# Patient Record
Sex: FEMALE | URBAN - METROPOLITAN AREA
[De-identification: names, ages, dates, MRNs, and addresses within clinical notes are randomized per-mention and may not be internally consistent; named-entity substitution may affect disease eponyms.]

---
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
## 2021-01-12 ENCOUNTER — ROUTINE PRENATAL (OUTPATIENT)
Dept: OBGYN CLINIC | Age: 21
End: 2021-01-12
Payer: COMMERCIAL

## 2021-01-12 VITALS
BODY MASS INDEX: 34.52 KG/M2 | WEIGHT: 227 LBS | HEART RATE: 88 BPM | SYSTOLIC BLOOD PRESSURE: 98 MMHG | DIASTOLIC BLOOD PRESSURE: 64 MMHG

## 2021-01-12 DIAGNOSIS — R82.71 GBS BACTERIURIA: ICD-10-CM

## 2021-01-12 DIAGNOSIS — O26.899 RH NEGATIVE STATE IN ANTEPARTUM PERIOD: ICD-10-CM

## 2021-01-12 DIAGNOSIS — Z34.92 PRENATAL CARE IN SECOND TRIMESTER: Primary | ICD-10-CM

## 2021-01-12 DIAGNOSIS — Z34.91 PRENATAL CARE IN FIRST TRIMESTER: ICD-10-CM

## 2021-01-12 DIAGNOSIS — Z67.91 RH NEGATIVE STATE IN ANTEPARTUM PERIOD: ICD-10-CM

## 2021-01-12 PROCEDURE — 36415 COLL VENOUS BLD VENIPUNCTURE: CPT | Performed by: OBSTETRICS & GYNECOLOGY

## 2021-01-12 PROCEDURE — 0502F SUBSEQUENT PRENATAL CARE: CPT | Performed by: OBSTETRICS & GYNECOLOGY

## 2021-01-12 NOTE — ASSESSMENT & PLAN NOTE
- FWB: reassuring by rohan today  - Genetic screening: MQS today  - Anatomy scan: plan at 20 weeks  - Flu shot: declined today  - Tdap: plan at 28wks  - PNL: B-/ab-, RI, HIVnr, RPRnr, HepB neg, UDS neg, Hgb 12.2, GCCT/trich neg, UCx +GBS (will need abx in labor)

## 2021-01-12 NOTE — PROGRESS NOTES
Temporal temp: 97.3  Maternal emotional well being screening form completed and reviewed with patient. Current score is 8. Patient given referral to [address] [place] [address] ([phone]):  No

## 2021-01-12 NOTE — PROGRESS NOTES
Return OB Office Visit    CC:   Chief Complaint   Patient presents with    Routine Prenatal Visit       HPI:  Pt seen and examined. No concerns/complaints. Denies VB, LOF. No cramps. ?maybe feeling small fetal movements. Does say she feels occasionally nauseated, especially if she doesn't eat anything first thing in the morning. Sometimes gets nauseated with certain smells as well but overall feeling OK. Maternal wellness questionnaire reviewed - no concerns today. Score 8. Says she has taken a break from work for right now and her moods have been better, has been able to focus more on taking care of herself.     Objective:  BP 98/64   Pulse 88   Wt (!) 227 lb (103 kg)   LMP 09/27/2020 (Exact Date)   BMI 34.52 kg/m²   Gen: AO, NAD  Abd: Soft, NT  FHT: 203  FH: below umbilicus    Assessment/Plan:  24 y.o. Ana Washington at 15w2d (Estimated Date of Delivery: 7/4/21) presents for KATH appointment:     Problem List Items Addressed This Visit        Gynecology/Obstetric Problems    Prenatal care in first trimester     - FWB: reassuring by rohan today  - Genetic screening: MQS today  - Anatomy scan: plan at 20 weeks  - Flu shot: declined today  - Tdap: plan at 28wks  - PNL: B-/ab-, RI, HIVnr, RPRnr, HepB neg, UDS neg, Hgb 12.2, GCCT/trich neg, UCx +GBS (will need abx in labor)         Rh negative state in antepartum period     B-/ab-  - will need rhogam at 28wks and PP work-up            Other    GBS bacteriuria     On initial UCx, asymptomatic  - plan abx in labor           Other Visit Diagnoses     Prenatal care in second trimester    -  Primary    Relevant Orders    MATERNAL SCREEN 4          Dispo: RTC in 4 weeks  Tiera Singh MD

## 2021-01-14 LAB
AFP INTERPRETATION: NORMAL
AFP MOM: 1.16
AFP SPECIMEN: NORMAL
D-INHIBIN: 141 PG/ML
DATING: NORMAL
EER MATERNAL SCREEN AFP, HCG, EST, INH: NORMAL
ESTIMATED DUE DATE: NORMAL
FETUS COUNT: NORMAL
GESTATIONAL AGE CALC AT COLLECT: NORMAL
HISTORY OF ANEUPLOIDY?: NO
HISTORY/NEURAL TUBE DEFECTS: NO
INSULIN DEP. DIABETIC: NO
MATERNAL AGE AT EDD: 19.8 YR
MATERNAL WEIGHT: NORMAL
MOM FOR HCG, 2ND TRIMESTER: 0.6
MOM FOR UE3: 0.6
MOM INHIBN: 1.1
PATIENT'S HCG, 2ND TRIMESTER: NORMAL IU/L
PT AFP: 28 NG/ML
PT UE3: 0.45 NG/ML
RACE: NORMAL
SMOKING: NO

## 2021-02-09 ENCOUNTER — OFFICE VISIT (OUTPATIENT)
Dept: OBGYN CLINIC | Age: 21
End: 2021-02-09
Payer: COMMERCIAL

## 2021-02-09 ENCOUNTER — ROUTINE PRENATAL (OUTPATIENT)
Dept: OBGYN CLINIC | Age: 21
End: 2021-02-09

## 2021-02-09 VITALS
BODY MASS INDEX: 34.76 KG/M2 | HEART RATE: 99 BPM | DIASTOLIC BLOOD PRESSURE: 80 MMHG | WEIGHT: 228.6 LBS | SYSTOLIC BLOOD PRESSURE: 138 MMHG

## 2021-02-09 DIAGNOSIS — O26.899 RH NEGATIVE STATE IN ANTEPARTUM PERIOD: ICD-10-CM

## 2021-02-09 DIAGNOSIS — Z34.92 PRENATAL CARE IN SECOND TRIMESTER: Primary | ICD-10-CM

## 2021-02-09 DIAGNOSIS — R82.71 GBS BACTERIURIA: ICD-10-CM

## 2021-02-09 DIAGNOSIS — Z67.91 RH NEGATIVE STATE IN ANTEPARTUM PERIOD: ICD-10-CM

## 2021-02-09 LAB
ABDOMINAL CIRCUMFERENCE: NORMAL
BIPARIETAL DIAMETER: NORMAL
ESTIMATED FETAL WEIGHT: NORMAL
FEMORAL DIAMETER: NORMAL
HC/AC: NORMAL
HEAD CIRCUMFERENCE: NORMAL

## 2021-02-09 PROCEDURE — 0502F SUBSEQUENT PRENATAL CARE: CPT | Performed by: OBSTETRICS & GYNECOLOGY

## 2021-02-09 PROCEDURE — 76805 OB US >/= 14 WKS SNGL FETUS: CPT | Performed by: OBSTETRICS & GYNECOLOGY

## 2021-02-09 PROCEDURE — 76816 OB US FOLLOW-UP PER FETUS: CPT | Performed by: OBSTETRICS & GYNECOLOGY

## 2021-02-09 NOTE — PROGRESS NOTES
Return OB Office Visit    CC:   Chief Complaint   Patient presents with    Routine Prenatal Visit       HPI:  Pt seen and examined. No concerns/complaints. Denies VB, LOF, ctx. Has had a few small fetal movements but not regular or consistent yet. Maternal wellness questionnaire reviewed - no concerns today. Score 2. Objective:  /80   Pulse 99   Wt (!) 228 lb 9.6 oz (103.7 kg)   LMP 09/27/2020 (Exact Date)   BMI 34.76 kg/m²   Gen: AO, NAD  Abd: Soft, NT  FHT: 162    Ultrasound:  Impression   OBSTETRIC ULTRASOUND -- SECOND TRIMESTER       DATE:  02/09/2021       PHYSICIAN: Mukul Grimm M.D.       SONOGRAPHER: Ben Segal RDMS       INDICATION:  Second trimester, Anatomical screening       TYPE OF SCAN: vaginal, abdominal 3.5 MHz 5MHz       FINDINGS:         A single viable intrauterine pregnancy is noted in Breech presentation. Cardiac and somatic activity are noted.       The following values were obtained:   Fetal heart rate 162bpm   BPD 3.74cm 1.5 %   Head Circumference 16.10cm 24.9 %    Abdominal Circumference 14.29cm 57.5 %   Femur Length 2.71cm 12.1 %   Humerus Length 2.84cm 47.2 %   Cerebellum 1.86cm 11.0 %   Amniotic fluid DVP 4.04cm   EFW 265g 26.0 percentile       Subjective amniotic fluid volume is normal. Based on sonographic criteria, the estimated fetal age is 18weeks and 4days with EDC of 07/09/2021. Maudie Larve is a 5 day discordance with the established EDC of 07/04/2021. The patient has an anterior placenta that is \"adequate distance in relation to the internal cervical os. The evaluation of the lower uterine segment and cervix reveals normal appearing anatomy. Transvaginal cervical length is 4.38cm with no funneling noted. The uterus is unremarkable/gravid.  Maternal ovaries and adnexae are not well visualized due to the size of the uterus and patient's gravid state.       Normal Anatomy seen: Spine CSP   Kidneys Lateral ventricles   Umbilical arteries Cerebellum              Bladder Cisterna magna   Aortic arch Face Nuchal fold   Diaphragm                Upper extremities   Stomach Nose/lips Lower extremities   ACI PCI Choroid plexus       Suboptimal anatomy seen: 4 chamber heart, lvot, rvot, ductal arch, profile, gender       Abnormal anatomy seen:       IMPRESSION:   Single living IUP. No gross structural abnormalities are visualized. Amniotic fluid volume is subjectively normal.       The patient is well aware of the limitations of ultrasound in the detection of fetal anomalies.  The scan is limited by maternal body habitus.              Assessment/Plan:  24 y.o. Wynette Degree at 19w2d (Estimated Date of Delivery: 7/4/21) presents for Qian Kaminski [facility] appointment:     Problem List Items Addressed This Visit        Gynecology/Obstetric Problems    Prenatal care in second trimester - Primary     - FWB: reassuring by US today  - Genetic screening: MQS low risk  - Anatomy scan: 2/9/21 -- Overall wnl although limited due to positioning. Anterior placenta, normal fluid and CL 4.38cm.      - complete anatomy at next visit  - Flu shot: declined today  - Tdap: plan at 28wks  - PNL: B-/ab-, RI, HIVnr, RPRnr, HepB neg, UDS neg, Hgb 12.2, GCCT/trich neg, UCx +GBS (will need abx in labor)         Rh negative state in antepartum period     B-/ab-  - will need rhogam at 28wks and PP work-up            Other    GBS bacteriuria     On initial UCx  - plan abx in labor               Dispo: RTC in 4 weeks, US at that time  Ely Ramos MD

## 2021-02-10 ENCOUNTER — TELEPHONE (OUTPATIENT)
Dept: OBGYN CLINIC | Age: 21
End: 2021-02-10

## 2021-02-10 NOTE — TELEPHONE ENCOUNTER
Please advise patient to continue to drink and eat throughout the day. Take Tylenol for headache. Okay for appointment in AM.  If no improvement or symptoms return please advise patient to present to triage tonight. Thank you.

## 2021-02-10 NOTE — TELEPHONE ENCOUNTER
Pt 19 w 3d .  is calling due to pt having Dizziness and headache around 11 am at work. Describes headache as \"pounding\" No hx of migraines. She has not taken Tylenol or any medication for this. She is not able to check her B/P. She has ate Bananna's,granola bars and milk today. She denies emesis but has had nausea. No pain. +FM, No vaginal discharge or drainage. States she was \" seeing spots earlier\" Dizziness has now stopped but she still has a headache. No CP. No SOB. She is scheduled for am B/P check in office. Please advise if Triage needed sooner.

## 2021-02-11 ENCOUNTER — TELEPHONE (OUTPATIENT)
Dept: OBGYN CLINIC | Age: 21
End: 2021-02-11

## 2021-02-11 ENCOUNTER — NURSE ONLY (OUTPATIENT)
Dept: OBGYN CLINIC | Age: 21
End: 2021-02-11

## 2021-02-11 VITALS — DIASTOLIC BLOOD PRESSURE: 68 MMHG | TEMPERATURE: 97.2 F | SYSTOLIC BLOOD PRESSURE: 114 MMHG | HEART RATE: 89 BPM

## 2021-03-09 ENCOUNTER — ROUTINE PRENATAL (OUTPATIENT)
Dept: OBGYN CLINIC | Age: 21
End: 2021-03-09

## 2021-03-09 ENCOUNTER — OFFICE VISIT (OUTPATIENT)
Dept: OBGYN CLINIC | Age: 21
End: 2021-03-09

## 2021-03-09 VITALS
BODY MASS INDEX: 35.97 KG/M2 | DIASTOLIC BLOOD PRESSURE: 82 MMHG | HEART RATE: 100 BPM | WEIGHT: 236.6 LBS | SYSTOLIC BLOOD PRESSURE: 120 MMHG

## 2021-03-09 DIAGNOSIS — O26.899 RH NEGATIVE STATE IN ANTEPARTUM PERIOD: ICD-10-CM

## 2021-03-09 DIAGNOSIS — R82.71 GBS BACTERIURIA: ICD-10-CM

## 2021-03-09 DIAGNOSIS — Z67.91 RH NEGATIVE STATE IN ANTEPARTUM PERIOD: ICD-10-CM

## 2021-03-09 DIAGNOSIS — Z34.92 PRENATAL CARE IN SECOND TRIMESTER: Primary | ICD-10-CM

## 2021-03-09 PROCEDURE — 99213 OFFICE O/P EST LOW 20 MIN: CPT | Performed by: OBSTETRICS & GYNECOLOGY

## 2021-03-09 PROCEDURE — 76816 OB US FOLLOW-UP PER FETUS: CPT | Performed by: OBSTETRICS & GYNECOLOGY

## 2021-03-09 NOTE — PROGRESS NOTES
Return OB Office Visit    CC:   Chief Complaint   Patient presents with    Routine Prenatal Visit       HPI:  Pt seen and examined. No concerns/complaints. Denies VB, LOF, ctx. +FM. Has been feeling well since last visit overall    Maternal wellness questionnaire reviewed - no concerns today. Score 6. Objective:  /82   Pulse 100   Wt 236 lb 9.6 oz (107.3 kg)   LMP 09/27/2020 (Exact Date)   BMI 35.97 kg/m²   Gen: AO, NAD  Abd: Soft, NT  FHT: 150    Ultrasound:  Impression   OBSTETRICAL ULTRASOUND GROWTH       DATE: 3/9/21       PHYSICIAN: Kym Youngblood M.D.        SONOGRAPHER: Philipp Lazaro RDMS       INDICATION: Growth, anatomy follow up       TYPE OF SCAN: abdominal       FINDINGS:   A single viable intrauterine pregnancy is noted in Breech presentation. Cardiac and somatic activity are noted.       The following values were obtained:   Fetal heart rate 150bpm   BPD 4.84cm <1 %   Head Circumference 19.93cm 4.5 %    Abdominal Circumference 18.90cm 53.6 %   Femur Length 3.86cm 13.6 %   Amniotic fluid index 14.98cm   EFW 542g 24.4 percentile       Amniotic fluid volume is normal. Based on sonographic criteria the estimated fetal age is 22weeks and 1days with EDC of 7/12/21. There is a 8 day discordance with the established EDC of 7/4/21.        The patient has an anterior placenta that is adequate distance in relation to the internal cervical os. The evaluation of the lower uterine segment and cervix reveals normal appearing anatomy.  The uterus is unremarkable/gravid.  Maternal ovaries and adnexae are not well visualized due to the size of the uterus and patient's gravid state.       Anatomy seen includes: 4 chamber heart, LVOT, RVOT, ductal arch, profile, gender (female), stomach, kidneys, bladder       IMPRESSION:   Single live IUP in the second trimester. Adequate interval fetal growth.        Imaging is limited secondary to fetal position.    The patient is well aware of the limitations of ultrasound in

## 2021-03-09 NOTE — ASSESSMENT & PLAN NOTE
- FWB: reassuring by US today  - Genetic screening: MQS low risk  - Anatomy scan: 2/9/21 -- Overall wnl although limited due to positioning. Anterior placenta, normal fluid and CL 4.38cm.      - completed anatomy 3/9/21  - Flu shot: declined   - Tdap: plan at 28wks  - PNL: B-/ab-, RI, HIVnr, RPRnr, HepB neg, UDS neg, Hgb 12.2, GCCT/trich neg, UCx +GBS (will need abx in labor)     - GTT/CBC next visit

## 2021-04-06 ENCOUNTER — ROUTINE PRENATAL (OUTPATIENT)
Dept: OBGYN CLINIC | Age: 21
End: 2021-04-06
Payer: COMMERCIAL

## 2021-04-06 VITALS
WEIGHT: 239.6 LBS | HEART RATE: 99 BPM | BODY MASS INDEX: 36.43 KG/M2 | SYSTOLIC BLOOD PRESSURE: 128 MMHG | DIASTOLIC BLOOD PRESSURE: 70 MMHG

## 2021-04-06 DIAGNOSIS — O26.899 RH NEGATIVE STATE IN ANTEPARTUM PERIOD: ICD-10-CM

## 2021-04-06 DIAGNOSIS — Z34.92 PRENATAL CARE IN SECOND TRIMESTER: Primary | ICD-10-CM

## 2021-04-06 DIAGNOSIS — Z67.91 RH NEGATIVE STATE IN ANTEPARTUM PERIOD: ICD-10-CM

## 2021-04-06 DIAGNOSIS — R82.71 GBS BACTERIURIA: ICD-10-CM

## 2021-04-06 PROCEDURE — 96372 THER/PROPH/DIAG INJ SC/IM: CPT | Performed by: OBSTETRICS & GYNECOLOGY

## 2021-04-06 PROCEDURE — 0502F SUBSEQUENT PRENATAL CARE: CPT | Performed by: OBSTETRICS & GYNECOLOGY

## 2021-04-06 PROCEDURE — 36415 COLL VENOUS BLD VENIPUNCTURE: CPT | Performed by: OBSTETRICS & GYNECOLOGY

## 2021-04-06 NOTE — PROGRESS NOTES
Return OB Office Visit    CC:   Chief Complaint   Patient presents with    Routine Prenatal Visit       HPI:  Pt seen and examined. No concerns/complaints. Denies VB, LOF, ctx. +FM    Went to ER a couple days ago, was seen at [facility] for abdominal cramping. Told it was probably roudn ligament pain, cervix not dilated and unchanged after 2hr recheck. Maternal wellness questionnaire reviewed - no concerns today. Score 6. Objective:  /70   Pulse 99   Wt 239 lb 9.6 oz (108.7 kg)   LMP 09/27/2020 (Exact Date)   BMI 36.43 kg/m²   Gen: AO, NAD  Abd: Soft, NT  FHT: 153  FH: 27cm  Assessment/Plan:  24 y.o. Shakeel Clark at 27w2d (Estimated Date of Delivery: 7/4/21) presents for KATH appointment:     Problem List Items Addressed This Visit        Gynecology/Obstetric Problems    Prenatal care in second trimester - Primary     - FWB: reassuring by US today  - Genetic screening: MQS low risk  - Anatomy scan: 2/9/21 -- Overall wnl although limited due to positioning. Anterior placenta, normal fluid and CL 4.38cm.      - completed anatomy 3/9/21  - Flu shot: declined   - Tdap: 4/6/21  - PNL: B-/ab-, RI, HIVnr, RPRnr, HepB neg, UDS neg, Hgb 12.2, GCCT/trich neg, UCx +GBS (will need abx in labor)     - GTT/CBC sent today         Relevant Orders    CBC WITH AUTO DIFFERENTIAL    GLUCOSE CHALLENGE GESTATIONAL    TYPE AND SCREEN    Rh negative state in antepartum period     B-/ab-  - rhogam given 4/6/21  - PP workup            Other    GBS bacteriuria     On initial UCx  - plan abx in labor               Dispo: RTC in 2 weeks  Marsha Vitale MD

## 2021-04-06 NOTE — ASSESSMENT & PLAN NOTE
- FWB: reassuring by US today  - Genetic screening: MQS low risk  - Anatomy scan: 2/9/21 -- Overall wnl although limited due to positioning. Anterior placenta, normal fluid and CL 4.38cm.      - completed anatomy 3/9/21  - Flu shot: declined   - Tdap: 4/6/21  - PNL: B-/ab-, RI, HIVnr, RPRnr, HepB neg, UDS neg, Hgb 12.2, GCCT/trich neg, UCx +GBS (will need abx in labor)     - GTT/CBC sent today

## 2021-04-07 LAB
ABO/RH: NORMAL
ANTIBODY SCREEN: NORMAL
BASOPHILS ABSOLUTE: 0 K/UL (ref 0–0.2)
BASOPHILS RELATIVE PERCENT: 0.1 %
EOSINOPHILS ABSOLUTE: 0 K/UL (ref 0–0.6)
EOSINOPHILS RELATIVE PERCENT: 0.2 %
GLUCOSE CHALLENGE: 81 MG/DL
HCT VFR BLD CALC: 38.3 % (ref 36–48)
HEMOGLOBIN: 12.1 G/DL (ref 12–16)
LYMPHOCYTES ABSOLUTE: 1.8 K/UL (ref 1–5.1)
LYMPHOCYTES RELATIVE PERCENT: 14.8 %
MCH RBC QN AUTO: 25.5 PG (ref 26–34)
MCHC RBC AUTO-ENTMCNC: 31.5 G/DL (ref 31–36)
MCV RBC AUTO: 80.8 FL (ref 80–100)
MONOCYTES ABSOLUTE: 0.4 K/UL (ref 0–1.3)
MONOCYTES RELATIVE PERCENT: 3.7 %
NEUTROPHILS ABSOLUTE: 9.6 K/UL (ref 1.7–7.7)
NEUTROPHILS RELATIVE PERCENT: 81.2 %
PDW BLD-RTO: 16 % (ref 12.4–15.4)
PLATELET # BLD: 216 K/UL (ref 135–450)
PMV BLD AUTO: 11 FL (ref 5–10.5)
RBC # BLD: 4.74 M/UL (ref 4–5.2)
WBC # BLD: 11.9 K/UL (ref 4–11)

## 2021-04-19 ENCOUNTER — TELEPHONE (OUTPATIENT)
Dept: OBGYN CLINIC | Age: 21
End: 2021-04-19

## 2021-04-20 ENCOUNTER — ROUTINE PRENATAL (OUTPATIENT)
Dept: OBGYN CLINIC | Age: 21
End: 2021-04-20

## 2021-04-20 VITALS
WEIGHT: 240.5 LBS | SYSTOLIC BLOOD PRESSURE: 122 MMHG | DIASTOLIC BLOOD PRESSURE: 80 MMHG | HEART RATE: 89 BPM | BODY MASS INDEX: 36.57 KG/M2

## 2021-04-20 DIAGNOSIS — O26.899 RH NEGATIVE STATE IN ANTEPARTUM PERIOD: ICD-10-CM

## 2021-04-20 DIAGNOSIS — R82.71 GBS BACTERIURIA: ICD-10-CM

## 2021-04-20 DIAGNOSIS — Z67.91 RH NEGATIVE STATE IN ANTEPARTUM PERIOD: ICD-10-CM

## 2021-04-20 DIAGNOSIS — Z34.93 PRENATAL CARE IN THIRD TRIMESTER: Primary | ICD-10-CM

## 2021-04-20 PROCEDURE — 0502F SUBSEQUENT PRENATAL CARE: CPT | Performed by: OBSTETRICS & GYNECOLOGY

## 2021-04-20 NOTE — PROGRESS NOTES
Temp 97.3 F infrared  Maternal emotional well being screening form completed and reviewed with patient. Current score is 5. Patient given referral to [facility] ([phone]):  No

## 2021-04-20 NOTE — PROGRESS NOTES
Return OB Office Visit    CC:   Chief Complaint   Patient presents with    Routine Prenatal Visit       HPI:  Pt seen and examined. No concerns/complaints. Denies VB, LOF, ctx. +FM. Doing well overall today without complaints. Maternal wellness questionnaire reviewed - no concerns today. Score 5. Objective:  /80   Pulse 89   Wt 240 lb 8 oz (109.1 kg)   LMP 09/27/2020 (Exact Date)   BMI 36.57 kg/m²   Gen: AO, NAD  Abd: Soft, NT  FHT: 149  FH: 31cm    Assessment/Plan:  24 y.o. Padma Prabhakar at 29w2d (Estimated Date of Delivery: 7/4/21) presents for KATH appointment:     Problem List Items Addressed This Visit        Gynecology/Obstetric Problems    Prenatal care in third trimester - Primary     - FWB: reassuring by rohan today  - Genetic screening: MQS low risk  - Anatomy scan: 2/9/21 -- Overall wnl although limited due to positioning. Anterior placenta, normal fluid and CL 4.38cm.      - completed anatomy 3/9/21  - Flu shot: declined   - Tdap: 4/6/21  - PNL: B-/ab-, RI, HIVnr, RPRnr, HepB neg, UDS neg, Hgb 12.2, GCCT/trich neg, UCx +GBS (will need abx in labor)     -1hr GTT 81, Hgb 12.1, Platelets 301  - Breast pump rx sent 4/20/21         Rh negative state in antepartum period     B-/ab-  - rhogam given 4/6/21  - PP workup            Other    GBS bacteriuria     On initial UCx  - plan abx in labor               Dispo: RTC in 2 weeks  Michaeleen Brunner, MD

## 2021-05-05 ENCOUNTER — ROUTINE PRENATAL (OUTPATIENT)
Dept: OBGYN CLINIC | Age: 21
End: 2021-05-05

## 2021-05-05 DIAGNOSIS — Z34.93 PRENATAL CARE IN THIRD TRIMESTER: Primary | ICD-10-CM

## 2021-05-05 DIAGNOSIS — O26.899 RH NEGATIVE STATE IN ANTEPARTUM PERIOD: ICD-10-CM

## 2021-05-05 DIAGNOSIS — Z67.91 RH NEGATIVE STATE IN ANTEPARTUM PERIOD: ICD-10-CM

## 2021-05-05 DIAGNOSIS — R82.71 GBS BACTERIURIA: ICD-10-CM

## 2021-05-05 PROCEDURE — 0502F SUBSEQUENT PRENATAL CARE: CPT | Performed by: OBSTETRICS & GYNECOLOGY

## 2021-05-05 NOTE — PROGRESS NOTES
24 y.o. Adolfo Crosser at [address] [facility] Estimated Date of Delivery: 7/4/21 here for KATH:     Pt seen and examined. No concerns/complaints. Lisa Dao. Denies VB, LOF, CTX. Endorses (+) FM. Denies fevers / chills / chest pain / shortness of breath. Denies HA, changes with vision, RUQ pain, edema. MWQ reviewed (Score: 4). Objective:  /88   Pulse 88   Wt 248 lb (112.5 kg)   LMP 09/27/2020 (Exact Date)   BMI 37.71 kg/m²   Gen: AO, NAD  Abd: Soft, NT, gravid    FH: 32cm   FHT: 145 bpm via doppler   Ext: Mild LE edema  OMM: Increased lumbar lordosis    Assessment/Plan:   Diagnosis Orders   1. Prenatal care in third trimester     2. Rh negative state in antepartum period     3. GBS bacteriuria        - reassuring maternal / fetal status     Follow Up  Return OB precautions reviewed   Return in about 2 weeks (around 5/19/2021) for Return OB visit.     Jeniffer Watts, DO

## 2021-05-11 VITALS
BODY MASS INDEX: 37.71 KG/M2 | DIASTOLIC BLOOD PRESSURE: 88 MMHG | WEIGHT: 248 LBS | SYSTOLIC BLOOD PRESSURE: 122 MMHG | HEART RATE: 88 BPM

## 2021-05-18 ENCOUNTER — ROUTINE PRENATAL (OUTPATIENT)
Dept: OBGYN CLINIC | Age: 21
End: 2021-05-18

## 2021-05-18 VITALS
HEART RATE: 102 BPM | WEIGHT: 248.6 LBS | BODY MASS INDEX: 37.8 KG/M2 | DIASTOLIC BLOOD PRESSURE: 74 MMHG | SYSTOLIC BLOOD PRESSURE: 120 MMHG

## 2021-05-18 DIAGNOSIS — Z67.91 RH NEGATIVE STATE IN ANTEPARTUM PERIOD: ICD-10-CM

## 2021-05-18 DIAGNOSIS — R82.71 GBS BACTERIURIA: ICD-10-CM

## 2021-05-18 DIAGNOSIS — Z34.93 PRENATAL CARE IN THIRD TRIMESTER: Primary | ICD-10-CM

## 2021-05-18 DIAGNOSIS — O26.899 RH NEGATIVE STATE IN ANTEPARTUM PERIOD: ICD-10-CM

## 2021-05-18 PROCEDURE — 0502F SUBSEQUENT PRENATAL CARE: CPT | Performed by: OBSTETRICS & GYNECOLOGY

## 2021-05-18 NOTE — PROGRESS NOTES
Temp: 97.5  Maternal emotional well being screening form completed and reviewed with patient. Current score is 3. Patient given referral to [address] [place] [address] ([phone]):  No
Will follow-up in 2 weeks for KATH visit     2. Rh negative state in antepartum period     - Rhogam at 28 weeks     - Will plan for evaluation postpartum    3.  GBS bacteriuria     - Plan for prophylaxis during labor      Uzair Ruvalcaba DO

## 2021-06-02 ENCOUNTER — ROUTINE PRENATAL (OUTPATIENT)
Dept: OBGYN CLINIC | Age: 21
End: 2021-06-02

## 2021-06-02 VITALS
HEART RATE: 102 BPM | DIASTOLIC BLOOD PRESSURE: 78 MMHG | WEIGHT: 256.6 LBS | BODY MASS INDEX: 39.02 KG/M2 | SYSTOLIC BLOOD PRESSURE: 128 MMHG

## 2021-06-02 DIAGNOSIS — Z67.91 RH NEGATIVE STATE IN ANTEPARTUM PERIOD: ICD-10-CM

## 2021-06-02 DIAGNOSIS — O26.899 RH NEGATIVE STATE IN ANTEPARTUM PERIOD: ICD-10-CM

## 2021-06-02 DIAGNOSIS — Z34.93 PRENATAL CARE IN THIRD TRIMESTER: Primary | ICD-10-CM

## 2021-06-02 DIAGNOSIS — R82.71 GBS BACTERIURIA: ICD-10-CM

## 2021-06-02 PROCEDURE — 0502F SUBSEQUENT PRENATAL CARE: CPT | Performed by: OBSTETRICS & GYNECOLOGY

## 2021-06-02 NOTE — ASSESSMENT & PLAN NOTE
- FWB: reassuring by doptones today  - Genetic screening: MQS low risk  - Anatomy scan: 2/9/21 -- Overall wnl although limited due to positioning. Anterior placenta, normal fluid and CL 4.38cm.      - completed anatomy 3/9/21  - Flu shot: declined   - Tdap: 4/6/21  - PNL: B-/ab-, RI, HIVnr, RPRnr, HepB neg, UDS neg, Hgb 12.2, GCCT/trich neg, UCx +GBS (will need abx in labor)     -1hr GTT 81, Hgb 12.1, Platelets 652  - Breast pump rx sent 4/20/21    - GBS next visit

## 2021-06-02 NOTE — PROGRESS NOTES
Temp-97.8f infrared  Maternal emotional well being screening form completed and reviewed with patient. Current score is 2. Patient given referral to [address] [place] [address] ([phone]):  No

## 2021-06-09 ENCOUNTER — ROUTINE PRENATAL (OUTPATIENT)
Dept: OBGYN CLINIC | Age: 21
End: 2021-06-09

## 2021-06-09 VITALS
HEART RATE: 117 BPM | WEIGHT: 257 LBS | BODY MASS INDEX: 39.08 KG/M2 | DIASTOLIC BLOOD PRESSURE: 80 MMHG | SYSTOLIC BLOOD PRESSURE: 126 MMHG

## 2021-06-09 DIAGNOSIS — O26.899 RH NEGATIVE STATE IN ANTEPARTUM PERIOD: ICD-10-CM

## 2021-06-09 DIAGNOSIS — Z34.93 PRENATAL CARE IN THIRD TRIMESTER: Primary | ICD-10-CM

## 2021-06-09 DIAGNOSIS — Z67.91 RH NEGATIVE STATE IN ANTEPARTUM PERIOD: ICD-10-CM

## 2021-06-09 DIAGNOSIS — R82.71 GBS BACTERIURIA: ICD-10-CM

## 2021-06-09 PROCEDURE — 0502F SUBSEQUENT PRENATAL CARE: CPT | Performed by: OBSTETRICS & GYNECOLOGY

## 2021-06-09 NOTE — PROGRESS NOTES
Temp-97.4f infrared  Maternal emotional well being screening form completed and reviewed with patient. Current score is 1. Patient given referral to [facility] ([phone]):  No

## 2021-06-09 NOTE — PROGRESS NOTES
Return OB Office Visit    CC:   Chief Complaint   Patient presents with    Routine Prenatal Visit       HPI:  Pt seen and examined. No concerns/complaints. Denies VB, LOF, ctx. +FM. Feels some cramps, a few times/day. Maternal wellness questionnaire reviewed - no concerns today. Score 1. Objective:  /80   Pulse (!) 117   Wt 257 lb (116.6 kg)   LMP 2020 (Exact Date)   BMI 39.08 kg/m²   Gen: AO, NAD  Abd: Soft, NT  FHT: 161  FH: 36cm, vertex by Leopolds  Ext: Mild LE edema  SVE: 3    Assessment/Plan:  24 y.o.  at 36w3d (Estimated Date of Delivery: 21) presents for KATH appointment:     Problem List Items Addressed This Visit        Gynecology/Obstetric Problems    Prenatal care in third trimester - Primary     - FWB: reassuring by rohan today  - Genetic screening: S low risk  - Anatomy scan: 21 -- Overall wnl although limited due to positioning. Anterior placenta, normal fluid and CL 4.38cm.      - completed anatomy 3/9/21  - Flu shot: declined   - Tdap: 21  - PNL: B-/ab-, RI, HIVnr, RPRnr, HepB neg, UDS neg, Hgb 12.2, GCCT/trich neg, UCx +GBS (will need abx in labor)     -1hr GTT 81, Hgb 12.1, Platelets 858  - Breast pump rx sent 21    - GBS sent today         Relevant Orders    Culture, Strep B Screen, Vaginal/Rectal    Rh negative state in antepartum period     B-/ab-  - rhogam given 21  - PP workup            Other    GBS bacteriuria     On initial UCx  - plan abx in labor               Dispo: RTC in 1 week  Dae Tineo MD

## 2021-06-11 LAB
GROUP B STREP CULTURE: ABNORMAL
ORGANISM: ABNORMAL

## 2021-06-15 ENCOUNTER — ROUTINE PRENATAL (OUTPATIENT)
Dept: OBGYN CLINIC | Age: 21
End: 2021-06-15

## 2021-06-15 VITALS
DIASTOLIC BLOOD PRESSURE: 88 MMHG | BODY MASS INDEX: 38.92 KG/M2 | WEIGHT: 256 LBS | HEART RATE: 119 BPM | SYSTOLIC BLOOD PRESSURE: 132 MMHG

## 2021-06-15 DIAGNOSIS — O26.899 RH NEGATIVE STATE IN ANTEPARTUM PERIOD: ICD-10-CM

## 2021-06-15 DIAGNOSIS — Z34.93 PRENATAL CARE IN THIRD TRIMESTER: Primary | ICD-10-CM

## 2021-06-15 DIAGNOSIS — Z67.91 RH NEGATIVE STATE IN ANTEPARTUM PERIOD: ICD-10-CM

## 2021-06-15 DIAGNOSIS — R82.71 GBS BACTERIURIA: ICD-10-CM

## 2021-06-15 PROCEDURE — 0502F SUBSEQUENT PRENATAL CARE: CPT | Performed by: OBSTETRICS & GYNECOLOGY

## 2021-06-15 NOTE — PROGRESS NOTES
Temp-97.9f infrared  Maternal emotional well being screening form completed and reviewed with patient. Current score is 0. Patient given referral to [address] [place] [address] ([phone]):  No

## 2021-06-15 NOTE — ASSESSMENT & PLAN NOTE
- FWB: reassuring by doptonduglas today  - Genetic screening: MQS low risk  - Anatomy scan: 2/9/21 -- Overall wnl although limited due to positioning. Anterior placenta, normal fluid and CL 4.38cm.      - completed anatomy 3/9/21  - Flu shot: declined   - Tdap: 4/6/21  - PNL: B-/ab-, RI, HIVnr, RPRnr, HepB neg, UDS neg, Hgb 12.2, GCCT/trich neg, UCx +GBS (will need abx in labor)     -1hr GTT 81, Hgb 12.1, Platelets 880  - Breast pump rx sent 4/20/21    - GBS positive

## 2021-06-24 ENCOUNTER — ROUTINE PRENATAL (OUTPATIENT)
Dept: OBGYN CLINIC | Age: 21
End: 2021-06-24

## 2021-06-24 VITALS
WEIGHT: 257.6 LBS | SYSTOLIC BLOOD PRESSURE: 138 MMHG | BODY MASS INDEX: 39.17 KG/M2 | DIASTOLIC BLOOD PRESSURE: 86 MMHG | HEART RATE: 103 BPM

## 2021-06-24 DIAGNOSIS — R82.71 GBS BACTERIURIA: ICD-10-CM

## 2021-06-24 DIAGNOSIS — Z34.93 PRENATAL CARE IN THIRD TRIMESTER: Primary | ICD-10-CM

## 2021-06-24 DIAGNOSIS — O26.899 RH NEGATIVE STATE IN ANTEPARTUM PERIOD: ICD-10-CM

## 2021-06-24 DIAGNOSIS — Z67.91 RH NEGATIVE STATE IN ANTEPARTUM PERIOD: ICD-10-CM

## 2021-06-24 PROCEDURE — 0502F SUBSEQUENT PRENATAL CARE: CPT | Performed by: OBSTETRICS & GYNECOLOGY

## 2021-06-24 NOTE — PROGRESS NOTES
Return OB Office Visit    CC:   Chief Complaint   Patient presents with    Routine Prenatal Visit       HPI:  Patient seen and examined. Patient is overall doing well today. Denies VB, LOF, ctx. +FM. Denies headaches, vision changes, RUQ pain, increased LE edema. Denies chest pain, shortness of breath, fever, chills, nausea, vomiting. Review of Systems: The following ROS was otherwise negative, except as noted in the HPI: constitutional, HEENT, respiratory, cardiovascular, gastrointestinal, genitourinary, skin, musculoskeletal, neurological, psych    Objective:  /86   Pulse (!) 103   Wt 257 lb 9.6 oz (116.8 kg)   LMP 2020 (Exact Date)   BMI 39.17 kg/m²     Physical Exam  Vitals reviewed. Constitutional:       General: She is not in acute distress. Appearance: She is well-developed. HENT:      Head: Normocephalic and atraumatic. Eyes:      Conjunctiva/sclera: Conjunctivae normal.   Cardiovascular:      Rate and Rhythm: Normal rate. Pulmonary:      Effort: Pulmonary effort is normal. No respiratory distress. Abdominal:      General: There is no distension. Palpations: Abdomen is soft. Tenderness: There is no abdominal tenderness. There is no guarding or rebound. Musculoskeletal:         General: No swelling. Skin:     General: Skin is warm and dry. Neurological:      Mental Status: She is alert and oriented to person, place, and time. Psychiatric:         Mood and Affect: Mood normal.         Behavior: Behavior normal.         Thought Content: Thought content normal.       FHR: 152 bpm by doppler    Assessment/Plan:   Geraldo Morales is a 24 y.o.  at 38w4d who presents for routine OB visit    1.  Prenatal care in third trimester     - Patient is doing well today     - Fetal wellbeing reassuring by FH and FHR today     -  Maternal wellness questionnaire reviewed - no concerns today, score 1     - Labor, decreased FM, VB, LOF precautions reviewed     - Desires spontaneous labor     - Will follow-up in 1 weeks for KATH visit     2. Rh negative state in antepartum period     - Rhogam at 28 weeks     - Will plan for evaluation postpartum    3.  GBS bacteriuria     - Plan for prophylaxis during labor      Amena Gerardo DO

## 2021-06-27 ENCOUNTER — HOSPITAL ENCOUNTER (OUTPATIENT)
Age: 21
Discharge: HOME | End: 2021-06-27
Attending: OBSTETRICS & GYNECOLOGY | Admitting: OBSTETRICS & GYNECOLOGY

## 2021-06-27 VITALS
DIASTOLIC BLOOD PRESSURE: 72 MMHG | WEIGHT: 257 LBS | HEART RATE: 86 BPM | TEMPERATURE: 97.6 F | BODY MASS INDEX: 40.34 KG/M2 | RESPIRATION RATE: 16 BRPM | SYSTOLIC BLOOD PRESSURE: 131 MMHG | HEIGHT: 67 IN

## 2021-06-27 PROCEDURE — 99211 OFF/OP EST MAY X REQ PHY/QHP: CPT

## 2021-06-27 NOTE — PROGRESS NOTES
Patient arrives to [facility] triage with complaints of decreased fetal movement starting yesterday 6/26 in the afternoon/ evening with some uterine cramping on and off. Patient has no complaints of leaking of fluid.

## 2021-06-27 NOTE — PROGRESS NOTES
Dr. Amanda George called and notified of patient's arrival, complaints, CAT I tracing, and anxious affect. Plan for House MD to see patient, and SVE to be performed if patient desires to be checked. Patient to be discharged home if House MD agreeable per Dr. Amanda George.

## 2021-06-27 NOTE — PROGRESS NOTES
CAT I tracing. SVE same from previous office visit per Dr. Inez Gilford. Patient to be discharged home per Dr. Lizabeth Ambriz and Dr. Inez Gilford.

## 2021-06-29 ENCOUNTER — ROUTINE PRENATAL (OUTPATIENT)
Dept: OBGYN CLINIC | Age: 21
End: 2021-06-29

## 2021-06-29 VITALS
DIASTOLIC BLOOD PRESSURE: 68 MMHG | WEIGHT: 256.6 LBS | BODY MASS INDEX: 40.19 KG/M2 | SYSTOLIC BLOOD PRESSURE: 112 MMHG | HEART RATE: 117 BPM

## 2021-06-29 DIAGNOSIS — O26.899 RH NEGATIVE STATE IN ANTEPARTUM PERIOD: ICD-10-CM

## 2021-06-29 DIAGNOSIS — Z67.91 RH NEGATIVE STATE IN ANTEPARTUM PERIOD: ICD-10-CM

## 2021-06-29 DIAGNOSIS — R82.71 GBS BACTERIURIA: ICD-10-CM

## 2021-06-29 DIAGNOSIS — Z34.93 PRENATAL CARE IN THIRD TRIMESTER: Primary | ICD-10-CM

## 2021-06-29 PROCEDURE — 0502F SUBSEQUENT PRENATAL CARE: CPT | Performed by: OBSTETRICS & GYNECOLOGY

## 2021-06-29 ASSESSMENT — PATIENT HEALTH QUESTIONNAIRE - PHQ9
SUM OF ALL RESPONSES TO PHQ QUESTIONS 1-9: 0
SUM OF ALL RESPONSES TO PHQ QUESTIONS 1-9: 0
2. FEELING DOWN, DEPRESSED OR HOPELESS: 0
SUM OF ALL RESPONSES TO PHQ QUESTIONS 1-9: 0
1. LITTLE INTEREST OR PLEASURE IN DOING THINGS: 0
SUM OF ALL RESPONSES TO PHQ9 QUESTIONS 1 & 2: 0

## 2021-06-29 NOTE — PROGRESS NOTES
Return OB Office Visit    CC:   Chief Complaint   Patient presents with    Routine Prenatal Visit       HPI:  Pt seen and examined. No concerns/complaints. Was seen in [facility] triage over the weekend for decreased FM. Since then baby has been moving well. No contractions, VB, LOF. Otherwise well without complaints. Maternal wellness questionnaire reviewed - no concerns today. Score 0. Objective:  /68   Pulse (!) 117   Wt 256 lb 9.6 oz (116.4 kg)   LMP 2020 (Exact Date)   BMI 40.19 kg/m²   Gen: AO, NAD  Abd: Soft, NT  FHT: 151  FH: 37cm, vertex by Leopolds  Ext: Mild LE edema  SVE: 1-/-3    Assessment/Plan:  24 y.o.  at 39w2d (Estimated Date of Delivery: 21) presents for KATH appointment:     Problem List Items Addressed This Visit        Gynecology/Obstetric Problems    Prenatal care in third trimester - Primary     - FWB: reassuring by rohan today  - Genetic screening: MQS low risk  - Anatomy scan: 21 -- Overall wnl although limited due to positioning. Anterior placenta, normal fluid and CL 4.38cm.      - completed anatomy 3/9/21  - Flu shot: declined   - Tdap: 21  - PNL: B-/ab-, RI, HIVnr, RPRnr, HepB neg, UDS neg, Hgb 12.2, GCCT/trich neg, UCx +GBS (will need abx in labor)     -1hr GTT 81, Hgb 12.1, Platelets 613  - Breast pump rx sent 21    - GBS positive      Postdates IOL scheduled for  at 1800          Rh negative state in antepartum period     B-/ab-  - rhogam given 21  - PP workup             Other    GBS bacteriuria     On initial UCx  - plan abx in labor                Dispo: RTC in 1 week, IOL  at [address], [place]

## 2021-06-29 NOTE — PROGRESS NOTES
Maternal emotional well being screening form completed and reviewed with patient. Current score is 0.    Temp.98.2

## 2021-07-06 ENCOUNTER — ROUTINE PRENATAL (OUTPATIENT)
Dept: OBGYN CLINIC | Age: 21
End: 2021-07-06

## 2021-07-06 VITALS
HEART RATE: 94 BPM | SYSTOLIC BLOOD PRESSURE: 118 MMHG | DIASTOLIC BLOOD PRESSURE: 82 MMHG | WEIGHT: 258.8 LBS | BODY MASS INDEX: 40.53 KG/M2

## 2021-07-06 DIAGNOSIS — R82.71 GBS BACTERIURIA: ICD-10-CM

## 2021-07-06 DIAGNOSIS — Z34.93 PRENATAL CARE IN THIRD TRIMESTER: Primary | ICD-10-CM

## 2021-07-06 DIAGNOSIS — Z67.91 RH NEGATIVE STATE IN ANTEPARTUM PERIOD: ICD-10-CM

## 2021-07-06 DIAGNOSIS — O26.899 RH NEGATIVE STATE IN ANTEPARTUM PERIOD: ICD-10-CM

## 2021-07-06 PROCEDURE — 0502F SUBSEQUENT PRENATAL CARE: CPT | Performed by: OBSTETRICS & GYNECOLOGY

## 2021-07-06 NOTE — PROGRESS NOTES
Return OB Office Visit    CC:   Chief Complaint   Patient presents with    Routine Prenatal Visit       HPI:  Patient seen and examined. Patient is overall doing well today. Denies VB, LOF, ctx. +FM. Denies headaches, vision changes, RUQ pain, increased LE edema. Denies chest pain, shortness of breath, fever, chills, nausea, vomiting. Review of Systems: The following ROS was otherwise negative, except as noted in the HPI: constitutional, HEENT, respiratory, cardiovascular, gastrointestinal, genitourinary, skin, musculoskeletal, neurological, psych    Objective:  /82   Pulse 94   Wt 258 lb 12.8 oz (117.4 kg)   LMP 2020 (Exact Date)   BMI 40.53 kg/m²     Physical Exam  Vitals reviewed. Constitutional:       General: She is not in acute distress. Appearance: She is well-developed. HENT:      Head: Normocephalic and atraumatic. Eyes:      Conjunctiva/sclera: Conjunctivae normal.   Cardiovascular:      Rate and Rhythm: Normal rate. Pulmonary:      Effort: Pulmonary effort is normal. No respiratory distress. Abdominal:      General: There is no distension. Palpations: Abdomen is soft. Tenderness: There is no abdominal tenderness. There is no guarding or rebound. Genitourinary:     General: Normal vulva. Vagina: No vaginal discharge. Musculoskeletal:         General: No swelling. Skin:     General: Skin is warm and dry. Neurological:      Mental Status: She is alert and oriented to person, place, and time. Psychiatric:         Mood and Affect: Mood normal.         Behavior: Behavior normal.         Thought Content: Thought content normal.       FHR: 148 bpm by doppler    Assessment/Plan:   Lyndsey Tinoco is a 24 y.o.  at 40w2d who presents for routine OB visit    1.  Prenatal care in third trimester     - Patient is doing well today     - Fetal wellbeing reassuring by FH and FHR today     -  Maternal wellness questionnaire reviewed - no concerns today, score 1     - Labor, decreased FM, VB, LOF precautions reviewed     - IOL scheduled 7/9/2021    2. Rh negative state in antepartum period     - Rhogam at 28 weeks     - Will plan for evaluation postpartum    3. GBS bacteriuria     - Plan for prophylaxis during labor    Approximately 20 minutes spent in room counseling patient on condition and coordination of care with over 50% in direct face to face counseling.          Carloz Wood DO

## 2021-07-06 NOTE — PROGRESS NOTES
Temp-97.9f infrared  Maternal emotional well being screening form completed and reviewed with patient. Current score is 1. Patient given referral to [address] [place] [address] ([phone]):  No

## 2021-07-09 ENCOUNTER — HOSPITAL ENCOUNTER (INPATIENT)
Age: 21
LOS: 4 days | Discharge: HOME | End: 2021-07-13
Attending: OBSTETRICS & GYNECOLOGY | Admitting: OBSTETRICS & GYNECOLOGY
Payer: COMMERCIAL

## 2021-07-09 ENCOUNTER — APPOINTMENT (OUTPATIENT)
Dept: LABOR AND DELIVERY | Age: 21
End: 2021-07-09
Payer: COMMERCIAL

## 2021-07-09 ENCOUNTER — TELEPHONE (OUTPATIENT)
Dept: OBGYN CLINIC | Age: 21
End: 2021-07-09

## 2021-07-09 PROBLEM — Z37.9 NORMAL LABOR: Status: ACTIVE | Noted: 2021-07-09

## 2021-07-09 LAB
ABO/RH: NORMAL
AMPHETAMINE SCREEN, URINE: NORMAL
ANTIBODY SCREEN: NORMAL
BARBITURATE SCREEN URINE: NORMAL
BASOPHILS ABSOLUTE: 0.1 K/UL (ref 0–0.2)
BASOPHILS RELATIVE PERCENT: 0.8 %
BENZODIAZEPINE SCREEN, URINE: NORMAL
BUPRENORPHINE URINE: NORMAL
CANNABINOID SCREEN URINE: NORMAL
COCAINE METABOLITE SCREEN URINE: NORMAL
EOSINOPHILS ABSOLUTE: 0 K/UL (ref 0–0.6)
EOSINOPHILS RELATIVE PERCENT: 0.3 %
HCT VFR BLD CALC: 38.3 % (ref 36–48)
HEMOGLOBIN: 12.7 G/DL (ref 12–16)
LYMPHOCYTES ABSOLUTE: 2 K/UL (ref 1–5.1)
LYMPHOCYTES RELATIVE PERCENT: 20 %
Lab: NORMAL
MCH RBC QN AUTO: 25.8 PG (ref 26–34)
MCHC RBC AUTO-ENTMCNC: 33.2 G/DL (ref 31–36)
MCV RBC AUTO: 77.7 FL (ref 80–100)
METHADONE SCREEN, URINE: NORMAL
MONOCYTES ABSOLUTE: 0.5 K/UL (ref 0–1.3)
MONOCYTES RELATIVE PERCENT: 5.3 %
NEUTROPHILS ABSOLUTE: 7.2 K/UL (ref 1.7–7.7)
NEUTROPHILS RELATIVE PERCENT: 73.6 %
OPIATE SCREEN URINE: NORMAL
OXYCODONE URINE: NORMAL
PDW BLD-RTO: 15.4 % (ref 12.4–15.4)
PH UA: 6
PHENCYCLIDINE SCREEN URINE: NORMAL
PLATELET # BLD: 190 K/UL (ref 135–450)
PMV BLD AUTO: 9.5 FL (ref 5–10.5)
PROPOXYPHENE SCREEN: NORMAL
RBC # BLD: 4.93 M/UL (ref 4–5.2)
WBC # BLD: 9.8 K/UL (ref 4–11)

## 2021-07-09 PROCEDURE — 80307 DRUG TEST PRSMV CHEM ANLYZR: CPT

## 2021-07-09 PROCEDURE — 86901 BLOOD TYPING SEROLOGIC RH(D): CPT

## 2021-07-09 PROCEDURE — 86780 TREPONEMA PALLIDUM: CPT

## 2021-07-09 PROCEDURE — 86900 BLOOD TYPING SEROLOGIC ABO: CPT

## 2021-07-09 PROCEDURE — 85025 COMPLETE CBC W/AUTO DIFF WBC: CPT

## 2021-07-09 PROCEDURE — 86850 RBC ANTIBODY SCREEN: CPT

## 2021-07-09 PROCEDURE — 1220000000 HC SEMI PRIVATE OB R&B

## 2021-07-09 PROCEDURE — 6360000002 HC RX W HCPCS: Performed by: OBSTETRICS & GYNECOLOGY

## 2021-07-09 PROCEDURE — 2580000003 HC RX 258: Performed by: OBSTETRICS & GYNECOLOGY

## 2021-07-09 RX ORDER — SODIUM CHLORIDE 9 MG/ML
25 INJECTION, SOLUTION INTRAVENOUS PRN
Status: DISCONTINUED | OUTPATIENT
Start: 2021-07-09 | End: 2021-07-11

## 2021-07-09 RX ORDER — DIPHENHYDRAMINE HYDROCHLORIDE 50 MG/ML
25 INJECTION INTRAMUSCULAR; INTRAVENOUS EVERY 4 HOURS PRN
Status: DISCONTINUED | OUTPATIENT
Start: 2021-07-09 | End: 2021-07-11

## 2021-07-09 RX ORDER — ACETAMINOPHEN 325 MG/1
650 TABLET ORAL EVERY 4 HOURS PRN
Status: DISCONTINUED | OUTPATIENT
Start: 2021-07-09 | End: 2021-07-11

## 2021-07-09 RX ORDER — SODIUM CHLORIDE 0.9 % (FLUSH) 0.9 %
5-40 SYRINGE (ML) INJECTION EVERY 12 HOURS SCHEDULED
Status: DISCONTINUED | OUTPATIENT
Start: 2021-07-09 | End: 2021-07-11

## 2021-07-09 RX ORDER — LIDOCAINE HYDROCHLORIDE 10 MG/ML
30 INJECTION, SOLUTION EPIDURAL; INFILTRATION; INTRACAUDAL; PERINEURAL PRN
Status: DISCONTINUED | OUTPATIENT
Start: 2021-07-09 | End: 2021-07-11

## 2021-07-09 RX ORDER — SODIUM CHLORIDE 0.9 % (FLUSH) 0.9 %
5-40 SYRINGE (ML) INJECTION PRN
Status: DISCONTINUED | OUTPATIENT
Start: 2021-07-09 | End: 2021-07-11

## 2021-07-09 RX ORDER — ONDANSETRON 2 MG/ML
4 INJECTION INTRAMUSCULAR; INTRAVENOUS EVERY 6 HOURS PRN
Status: DISCONTINUED | OUTPATIENT
Start: 2021-07-09 | End: 2021-07-09

## 2021-07-09 RX ORDER — SODIUM CHLORIDE, SODIUM LACTATE, POTASSIUM CHLORIDE, CALCIUM CHLORIDE 600; 310; 30; 20 MG/100ML; MG/100ML; MG/100ML; MG/100ML
INJECTION, SOLUTION INTRAVENOUS CONTINUOUS
Status: DISCONTINUED | OUTPATIENT
Start: 2021-07-09 | End: 2021-07-11

## 2021-07-09 RX ORDER — SODIUM CHLORIDE, SODIUM LACTATE, POTASSIUM CHLORIDE, AND CALCIUM CHLORIDE .6; .31; .03; .02 G/100ML; G/100ML; G/100ML; G/100ML
500 INJECTION, SOLUTION INTRAVENOUS PRN
Status: DISCONTINUED | OUTPATIENT
Start: 2021-07-09 | End: 2021-07-11

## 2021-07-09 RX ORDER — SODIUM CHLORIDE, SODIUM LACTATE, POTASSIUM CHLORIDE, AND CALCIUM CHLORIDE .6; .31; .03; .02 G/100ML; G/100ML; G/100ML; G/100ML
1000 INJECTION, SOLUTION INTRAVENOUS PRN
Status: DISCONTINUED | OUTPATIENT
Start: 2021-07-09 | End: 2021-07-11

## 2021-07-09 RX ORDER — DOCUSATE SODIUM 100 MG/1
100 CAPSULE, LIQUID FILLED ORAL 2 TIMES DAILY
Status: DISCONTINUED | OUTPATIENT
Start: 2021-07-09 | End: 2021-07-11

## 2021-07-09 RX ORDER — ONDANSETRON 2 MG/ML
4 INJECTION INTRAMUSCULAR; INTRAVENOUS EVERY 6 HOURS PRN
Status: DISCONTINUED | OUTPATIENT
Start: 2021-07-09 | End: 2021-07-11

## 2021-07-09 RX ADMIN — SODIUM CHLORIDE, POTASSIUM CHLORIDE, SODIUM LACTATE AND CALCIUM CHLORIDE: 600; 310; 30; 20 INJECTION, SOLUTION INTRAVENOUS at 19:32

## 2021-07-09 RX ADMIN — Medication 1 MILLI-UNITS/MIN: at 22:29

## 2021-07-09 NOTE — TELEPHONE ENCOUNTER
FYI-   Per Dr. Bishnu Acosta discussed with the patient at her visit Tuesday that we could change her to Saturday morning for induction based on her cervical dilation.  Please call patient and advise that labor and delivery is not able to accommodate this change and we will keep her induction at the planned time on Friday night.  Thank you. \"     I Left voicemail for patient advising her that labor and delivery was unable to accommodate her request to move her induction to Saturday. Patient is scheduled for today Friday 7/9/21 at 6 pm advised her to call 2 hrs prior to make sure they are running on time. Paul Alvarenga is going to make Dr. Sonia Gomes aware of plan.

## 2021-07-09 NOTE — TELEPHONE ENCOUNTER
Great, thanks. Can we try to reach out to her later today as well so she knows to come in tonight as initially planned? Thanks.

## 2021-07-10 LAB — TOTAL SYPHILLIS IGG/IGM: NORMAL

## 2021-07-10 PROCEDURE — 6360000002 HC RX W HCPCS: Performed by: OBSTETRICS & GYNECOLOGY

## 2021-07-10 PROCEDURE — 1220000000 HC SEMI PRIVATE OB R&B

## 2021-07-10 PROCEDURE — 6360000002 HC RX W HCPCS

## 2021-07-10 PROCEDURE — 2580000003 HC RX 258: Performed by: OBSTETRICS & GYNECOLOGY

## 2021-07-10 PROCEDURE — 99024 POSTOP FOLLOW-UP VISIT: CPT | Performed by: OBSTETRICS & GYNECOLOGY

## 2021-07-10 PROCEDURE — 6370000000 HC RX 637 (ALT 250 FOR IP): Performed by: OBSTETRICS & GYNECOLOGY

## 2021-07-10 RX ORDER — BUTORPHANOL TARTRATE 1 MG/ML
INJECTION, SOLUTION INTRAMUSCULAR; INTRAVENOUS
Status: COMPLETED
Start: 2021-07-10 | End: 2021-07-10

## 2021-07-10 RX ORDER — BUTORPHANOL TARTRATE 1 MG/ML
1 INJECTION, SOLUTION INTRAMUSCULAR; INTRAVENOUS
Status: DISCONTINUED | OUTPATIENT
Start: 2021-07-10 | End: 2021-07-11

## 2021-07-10 RX ADMIN — Medication 25 MCG: at 17:47

## 2021-07-10 RX ADMIN — DEXTROSE MONOHYDRATE 5 MILLION UNITS: 5 INJECTION INTRAVENOUS at 08:06

## 2021-07-10 RX ADMIN — SODIUM CHLORIDE, POTASSIUM CHLORIDE, SODIUM LACTATE AND CALCIUM CHLORIDE: 600; 310; 30; 20 INJECTION, SOLUTION INTRAVENOUS at 21:08

## 2021-07-10 RX ADMIN — Medication 2.5 MILLION UNITS: at 18:25

## 2021-07-10 RX ADMIN — ONDANSETRON 4 MG: 2 INJECTION INTRAMUSCULAR; INTRAVENOUS at 21:40

## 2021-07-10 RX ADMIN — SODIUM CHLORIDE, POTASSIUM CHLORIDE, SODIUM LACTATE AND CALCIUM CHLORIDE: 600; 310; 30; 20 INJECTION, SOLUTION INTRAVENOUS at 10:14

## 2021-07-10 RX ADMIN — Medication 2.5 MILLION UNITS: at 14:26

## 2021-07-10 RX ADMIN — BUTORPHANOL TARTRATE 1 MG: 1 INJECTION, SOLUTION INTRAMUSCULAR; INTRAVENOUS at 23:18

## 2021-07-10 RX ADMIN — Medication 1 MILLI-UNITS/MIN: at 23:30

## 2021-07-10 RX ADMIN — Medication 2.5 MILLION UNITS: at 22:34

## 2021-07-10 RX ADMIN — Medication 25 MCG: at 13:28

## 2021-07-10 RX ADMIN — Medication 25 MCG: at 09:23

## 2021-07-10 RX ADMIN — SODIUM CHLORIDE, POTASSIUM CHLORIDE, SODIUM LACTATE AND CALCIUM CHLORIDE: 600; 310; 30; 20 INJECTION, SOLUTION INTRAVENOUS at 02:25

## 2021-07-10 ASSESSMENT — PAIN SCALES - GENERAL: PAINLEVEL_OUTOF10: 7

## 2021-07-10 NOTE — PROGRESS NOTES
Labor Progress Note    Subjective:   Pt seen and examined. Doing well, no concerns/complaints. +FM, no VB/LOF. Feeling increasing cramps, nothing too painful yet. Objective:  /81   Pulse 76   Temp 98.8 °F (37.1 °C)   Resp 16   Ht 5' 7\" (1.702 m)   Wt 258 lb (117 kg)   LMP 09/27/2020 (Exact Date)   BMI 40.41 kg/m²   Cervix: 2/75/-2    Fetal Monitoring Interpretation   FHTs: 150 baseline, mod keyon, +accels, neg decels  [place]: q3-4 min    Infusions:    lactated ringers 125 mL/hr at 07/10/21 1106    sodium chloride      oxytocin Stopped (07/10/21 0831)    sodium chloride       ? Assessment/Plan:  1.  Intrapartum  - FHTs reassuring   - 3rd dose cytotec placed at this time, recheck in 4hr or PRN pain/pressure  - pt desires to avoid epidural, reviewed options for alternate pain management    Mu Mix MD

## 2021-07-10 NOTE — PROGRESS NOTES
Call from Dr. Mary Jo Jovel to check on patient. I informed her of contraction pattern and that patient was on 8 mahsa-units/min of pitocin and that I had a difficult time monitoring contractions. She said that was fine. I told her I had not started her Pcn-G yet. She said to just start it this morning.

## 2021-07-10 NOTE — PROGRESS NOTES
Dr Singh Grijalva called back. Ordered to keep Pitocin off and 30 mins later or after patient finishes breakfast, to place Cytotec for cervical ripening.  Will continue to monitor

## 2021-07-10 NOTE — PROGRESS NOTES
Patient's breakfast tray was delivered at this time, will allow patient time to eat and digest and agreed with patient to come back at 0915 to place cytotec

## 2021-07-10 NOTE — PROGRESS NOTES
Joan Meadows called for update on patient status, FHR and ctx pattern. Instructed to re-check SVE for change and to start Penicilin  0801 SVE 1-2/50/-3. Patient tolerated well, but asked if she can eat breakfast  0806 Penicillin 5 million units started at this time  Called Dr Guerline Garcia back to update and ask if patient can have breakfast. States OK for patient to order breakfast and have a break off pitocin.

## 2021-07-10 NOTE — H&P
Department of Obstetrics and Gynecology  Attending Obstetrics History and Physical        CHIEF COMPLAINT:  Intrauterine pregnancy at 40 weeks 5 days gestation    HISTORY OF PRESENT ILLNESS:      The patient is a 24 y.o.  1 parity 0 at 40 weeks 5 days gestation with [facility] 2021 who presents for induction of labor secondary to postdates. Pregnancy is complicated by Rh negative status, maternal weight, and GBBS positive. Denies vaginal bleeding, loss of fluid, regular contractions and decreased fetal movement. Patient presents with a chief complaint as above and is being admitted for induction    DATES:    Last Menstrual Period:  2020  Estimated Due Date:  2021    PRENATAL CARE:    Provider:  Ralph Akhtar MD, [facility]    Blood Type/Rh:  B negative  Antibody Screen:  negative  Rubella:  immune  RPR:  Non-reactive  Hepatitis B Surface Antigen: non-reactive  HIV:  Non-reactive  Gonorrhea:  negative  Chlamydia:  negative  MSAFP/Multiple Markers:  Date:  2021; Results:  Negative/normal  U/S Structural Survery:  See report  1 hour Glucose Tolerance Test:  81  Group B Strep:  positive      PAST OB HISTORY        Depression:  No      Post-partum depression:  No      Diabetes:  No      Gestational Diabetes:  No      Thyroid Disease:  No      Chronic HTN:  No      Gestation HTN:  No      Pre-eclampsia:  No      Seizure disorder:  No      Asthma:  No      Clotting disorder:  No      :  No      Tubal ligation:  No      D & C:  No      Cerclage:  No      LEEP:  No      Myomectomy:  No    OB History    Para Term  AB Living   1 0 0 0 0 0   SAB TAB Ectopic Molar Multiple Live Births   0 0 0 0 0 0      # Outcome Date GA Lbr Gilberto/2nd Weight Sex Delivery Anes PTL Lv   1 Current              Past Gynecological History:      Menarche:    Last menstrual period:  Patient's last menstrual period was 2020 (exact date).   History of uterine fibroids:  No  History of endometriosis: No  Pap History:  Patient has never had a Pap test.  Sexually transmitted disease history: none    Past Medical History:    History reviewed. No pertinent past medical history. Past Surgical History:    History reviewed. No pertinent surgical history. Social History:    TOBACCO:   reports that she has never smoked. She has never used smokeless tobacco.  ETOH:   reports no history of alcohol use. DRUGS:   reports no history of drug use. Family History:       Problem Relation Age of Onset    Hypertension Mother     High Blood Pressure Mother     Diabetes Mother     Endometrial Cancer Paternal Grandmother     Diabetes Maternal Grandmother     Cancer Maternal Grandfather      Medications Prior to Admission:  Medications Prior to Admission: Prenatal Multivit-Min-Fe-FA (PRE-GUSTAVO PO), Take by mouth  Allergies:  Patient has no known allergies. REVIEW OF SYSTEMS:    Patient has no history of depression.   Patient has no symptoms of depression  CONSTITUTIONAL:  negative for  fevers, chills, sweats and fatigue  RESPIRATORY:  negative for  dry cough, cough with sputum and dyspnea  CARDIOVASCULAR:  negative for  chest pain, dyspnea  GASTROINTESTINAL:  negative for nausea, vomiting, diarrhea, constipation and abdominal pain  GENITOURINARY:  negative for dysuria and hematuria  INTEGUMENT/BREAST:  negative  MUSCULOSKELETAL:  negative  NEUROLOGICAL:  Negative for headache and vision changes  BEHAVIOR/PSYCH:  negative    PHYSICAL EXAM:  Vitals:    21 2228   BP: 120/66   Pulse: 86   Resp: 16   Temp: 98.2 °F (36.8 °C)       General appearance:  awake, alert, cooperative, no apparent distress, and appears stated age  Neurologic:  Mental Status Exam:  Level of Alertness:   awake  Orientation:   person, place, time  Memory:   normal  Fund of Knowledge:  normal  Attention/Concentration:  normal  Language:  normal  Lungs:  No increased work of breathing, good air exchange, clear to auscultation bilaterally, no crackles or

## 2021-07-10 NOTE — PROGRESS NOTES
Call to Dr. Luiza Hinojosa to let her know that patient was here for induction. I also informed her of patient's 2 elevated BP on arrival, but they were within normal range now.  She stated she would be in in about 30 minutes to place shelton bulb for induction

## 2021-07-10 NOTE — PROGRESS NOTES
Dr. Missy Nicholas at bedside. Shelton bulb placed with 50ml saline. Patient tolerated well. Orders received to start pitocin and go up to 2 and leave it there all night. Orders received to start Pcn-G after shelton bulb comes out, her water breaks or labor becomes active.  Patient tolerated well

## 2021-07-11 ENCOUNTER — ANESTHESIA (OUTPATIENT)
Dept: LABOR AND DELIVERY | Age: 21
End: 2021-07-11
Payer: COMMERCIAL

## 2021-07-11 ENCOUNTER — ANESTHESIA EVENT (OUTPATIENT)
Dept: LABOR AND DELIVERY | Age: 21
End: 2021-07-11
Payer: COMMERCIAL

## 2021-07-11 PROCEDURE — 6370000000 HC RX 637 (ALT 250 FOR IP): Performed by: OBSTETRICS & GYNECOLOGY

## 2021-07-11 PROCEDURE — 2500000003 HC RX 250 WO HCPCS

## 2021-07-11 PROCEDURE — 59400 OBSTETRICAL CARE: CPT | Performed by: OBSTETRICS & GYNECOLOGY

## 2021-07-11 PROCEDURE — 7200000001 HC VAGINAL DELIVERY

## 2021-07-11 PROCEDURE — 6360000002 HC RX W HCPCS: Performed by: OBSTETRICS & GYNECOLOGY

## 2021-07-11 PROCEDURE — 2500000003 HC RX 250 WO HCPCS: Performed by: NURSE ANESTHETIST, CERTIFIED REGISTERED

## 2021-07-11 PROCEDURE — 1220000000 HC SEMI PRIVATE OB R&B

## 2021-07-11 PROCEDURE — 10907ZC DRAINAGE OF AMNIOTIC FLUID, THERAPEUTIC FROM PRODUCTS OF CONCEPTION, VIA NATURAL OR ARTIFICIAL OPENING: ICD-10-PCS | Performed by: OBSTETRICS & GYNECOLOGY

## 2021-07-11 PROCEDURE — 3700000025 EPIDURAL BLOCK: Performed by: ANESTHESIOLOGY

## 2021-07-11 PROCEDURE — 51701 INSERT BLADDER CATHETER: CPT

## 2021-07-11 PROCEDURE — 0KQM0ZZ REPAIR PERINEUM MUSCLE, OPEN APPROACH: ICD-10-PCS | Performed by: OBSTETRICS & GYNECOLOGY

## 2021-07-11 PROCEDURE — 2580000003 HC RX 258: Performed by: OBSTETRICS & GYNECOLOGY

## 2021-07-11 RX ORDER — FERROUS SULFATE 325(65) MG
325 TABLET ORAL
Status: DISCONTINUED | OUTPATIENT
Start: 2021-07-12 | End: 2021-07-13 | Stop reason: HOSPADM

## 2021-07-11 RX ORDER — SIMETHICONE 80 MG
80 TABLET,CHEWABLE ORAL EVERY 6 HOURS PRN
Status: DISCONTINUED | OUTPATIENT
Start: 2021-07-11 | End: 2021-07-13 | Stop reason: HOSPADM

## 2021-07-11 RX ORDER — BUPIVACAINE HYDROCHLORIDE 2.5 MG/ML
INJECTION, SOLUTION EPIDURAL; INFILTRATION; INTRACAUDAL PRN
Status: DISCONTINUED | OUTPATIENT
Start: 2021-07-11 | End: 2021-07-11 | Stop reason: SDUPTHER

## 2021-07-11 RX ORDER — ONDANSETRON 2 MG/ML
4 INJECTION INTRAMUSCULAR; INTRAVENOUS ONCE
Status: COMPLETED | OUTPATIENT
Start: 2021-07-11 | End: 2021-07-11

## 2021-07-11 RX ORDER — SODIUM CHLORIDE 9 MG/ML
25 INJECTION, SOLUTION INTRAVENOUS PRN
Status: DISCONTINUED | OUTPATIENT
Start: 2021-07-11 | End: 2021-07-13 | Stop reason: HOSPADM

## 2021-07-11 RX ORDER — IBUPROFEN 800 MG/1
800 TABLET ORAL EVERY 8 HOURS
Status: DISCONTINUED | OUTPATIENT
Start: 2021-07-11 | End: 2021-07-13 | Stop reason: HOSPADM

## 2021-07-11 RX ORDER — HYDROCODONE BITARTRATE AND ACETAMINOPHEN 5; 325 MG/1; MG/1
2 TABLET ORAL EVERY 6 HOURS PRN
Status: DISCONTINUED | OUTPATIENT
Start: 2021-07-11 | End: 2021-07-13 | Stop reason: HOSPADM

## 2021-07-11 RX ORDER — SODIUM CHLORIDE 0.9 % (FLUSH) 0.9 %
5-40 SYRINGE (ML) INJECTION EVERY 12 HOURS SCHEDULED
Status: DISCONTINUED | OUTPATIENT
Start: 2021-07-11 | End: 2021-07-13 | Stop reason: HOSPADM

## 2021-07-11 RX ORDER — LANOLIN 100 %
OINTMENT (GRAM) TOPICAL PRN
Status: DISCONTINUED | OUTPATIENT
Start: 2021-07-11 | End: 2021-07-13 | Stop reason: HOSPADM

## 2021-07-11 RX ORDER — BUTORPHANOL TARTRATE 1 MG/ML
1 INJECTION, SOLUTION INTRAMUSCULAR; INTRAVENOUS
Status: DISCONTINUED | OUTPATIENT
Start: 2021-07-11 | End: 2021-07-11

## 2021-07-11 RX ORDER — ACETAMINOPHEN 325 MG/1
650 TABLET ORAL EVERY 4 HOURS PRN
Status: DISCONTINUED | OUTPATIENT
Start: 2021-07-11 | End: 2021-07-13 | Stop reason: HOSPADM

## 2021-07-11 RX ORDER — PROMETHAZINE HYDROCHLORIDE 25 MG/ML
12.5 INJECTION, SOLUTION INTRAMUSCULAR; INTRAVENOUS EVERY 6 HOURS PRN
Status: DISCONTINUED | OUTPATIENT
Start: 2021-07-11 | End: 2021-07-11

## 2021-07-11 RX ORDER — ONDANSETRON 2 MG/ML
4 INJECTION INTRAMUSCULAR; INTRAVENOUS EVERY 6 HOURS PRN
Status: DISCONTINUED | OUTPATIENT
Start: 2021-07-11 | End: 2021-07-13 | Stop reason: HOSPADM

## 2021-07-11 RX ORDER — LIDOCAINE HYDROCHLORIDE 10 MG/ML
20 INJECTION, SOLUTION EPIDURAL; INFILTRATION; INTRACAUDAL; PERINEURAL ONCE
Status: DISCONTINUED | OUTPATIENT
Start: 2021-07-11 | End: 2021-07-11

## 2021-07-11 RX ORDER — HYDROCODONE BITARTRATE AND ACETAMINOPHEN 5; 325 MG/1; MG/1
1 TABLET ORAL EVERY 6 HOURS PRN
Status: DISCONTINUED | OUTPATIENT
Start: 2021-07-11 | End: 2021-07-13 | Stop reason: HOSPADM

## 2021-07-11 RX ORDER — DOCUSATE SODIUM 100 MG/1
100 CAPSULE, LIQUID FILLED ORAL 2 TIMES DAILY
Status: DISCONTINUED | OUTPATIENT
Start: 2021-07-11 | End: 2021-07-13 | Stop reason: HOSPADM

## 2021-07-11 RX ORDER — SODIUM CHLORIDE 0.9 % (FLUSH) 0.9 %
5-40 SYRINGE (ML) INJECTION PRN
Status: DISCONTINUED | OUTPATIENT
Start: 2021-07-11 | End: 2021-07-13 | Stop reason: HOSPADM

## 2021-07-11 RX ORDER — SODIUM CHLORIDE, SODIUM LACTATE, POTASSIUM CHLORIDE, CALCIUM CHLORIDE 600; 310; 30; 20 MG/100ML; MG/100ML; MG/100ML; MG/100ML
INJECTION, SOLUTION INTRAVENOUS CONTINUOUS
Status: DISCONTINUED | OUTPATIENT
Start: 2021-07-11 | End: 2021-07-13 | Stop reason: HOSPADM

## 2021-07-11 RX ORDER — LIDOCAINE HYDROCHLORIDE 10 MG/ML
INJECTION, SOLUTION INFILTRATION; PERINEURAL
Status: COMPLETED
Start: 2021-07-11 | End: 2021-07-11

## 2021-07-11 RX ORDER — FAMOTIDINE 20 MG/1
20 TABLET, FILM COATED ORAL 2 TIMES DAILY PRN
Status: DISCONTINUED | OUTPATIENT
Start: 2021-07-11 | End: 2021-07-13 | Stop reason: HOSPADM

## 2021-07-11 RX ADMIN — IBUPROFEN 800 MG: 800 TABLET, FILM COATED ORAL at 15:11

## 2021-07-11 RX ADMIN — ONDANSETRON 4 MG: 2 INJECTION INTRAMUSCULAR; INTRAVENOUS at 07:37

## 2021-07-11 RX ADMIN — ONDANSETRON 4 MG: 2 INJECTION INTRAMUSCULAR; INTRAVENOUS at 00:18

## 2021-07-11 RX ADMIN — BUPIVACAINE HYDROCHLORIDE 6 ML: 2.5 INJECTION, SOLUTION EPIDURAL; INFILTRATION; INTRACAUDAL; PERINEURAL at 01:28

## 2021-07-11 RX ADMIN — Medication 166.7 ML: at 10:27

## 2021-07-11 RX ADMIN — Medication 2.5 MILLION UNITS: at 02:25

## 2021-07-11 RX ADMIN — IBUPROFEN 800 MG: 800 TABLET, FILM COATED ORAL at 22:55

## 2021-07-11 RX ADMIN — Medication 2.5 MILLION UNITS: at 06:53

## 2021-07-11 RX ADMIN — SODIUM CHLORIDE, POTASSIUM CHLORIDE, SODIUM LACTATE AND CALCIUM CHLORIDE: 600; 310; 30; 20 INJECTION, SOLUTION INTRAVENOUS at 01:30

## 2021-07-11 RX ADMIN — SODIUM CHLORIDE, POTASSIUM CHLORIDE, SODIUM LACTATE AND CALCIUM CHLORIDE: 600; 310; 30; 20 INJECTION, SOLUTION INTRAVENOUS at 08:29

## 2021-07-11 RX ADMIN — DOCUSATE SODIUM 100 MG: 100 CAPSULE, LIQUID FILLED ORAL at 21:04

## 2021-07-11 RX ADMIN — Medication 15 ML/HR: at 01:32

## 2021-07-11 RX ADMIN — LIDOCAINE HYDROCHLORIDE 200 MG: 10 INJECTION, SOLUTION INFILTRATION; PERINEURAL at 10:36

## 2021-07-11 ASSESSMENT — PAIN SCALES - GENERAL
PAINLEVEL_OUTOF10: 7
PAINLEVEL_OUTOF10: 3

## 2021-07-11 NOTE — L&D DELIVERY SUMMARY NOTE
degree perineal laceration was repaired with 2-0 and 3-0 vicryl in the usual fashion. Hemostasis was noted. Rectum was found to be intact. Sponge, lap and needle counts were correct x 2. Patient tolerated the procedure well and was left to recover in L&D room in stable condition. Delivery Summary:  Labor & Delivery Summary  Labor Onset Date: 07/11/21  Labor Onset Time: 0509  Dilation Complete Date: 07/11/21  Dilation Complete Time: 5333    Specimen:  Placenta sent to pathology     Intake/Output:     Date 07/10/21 0701 - 07/11/21 0700 07/11/21 0701 - 07/12/21 0700   Shift 3335-5753 7951-0930 8289-8313 24 Hour Total 0650-5524 5710-2028 3982-6382 24 Hour Total   INTAKE   I.V. 1676.1 1071.6 404.8 3152.5 863.1   863.1   Shift Total 1676.1 1071.6 404.8 3152.5 863.1   863.1   OUTPUT   Urine   401 401 150   150   Shift Total   401 401 150   150   NET 1676.1 1071.6 3.8 2751.5 713.1   713.1   EBL: 350 cc    Condition:  infant stable and mother stable    Blood Type and Rh: B NEG        Rubella Immunity Status:   Immune           Infant Feeding:    breast    Attending Attestation: I performed the procedure.

## 2021-07-11 NOTE — PLAN OF CARE
Problem: Anxiety:  Goal: Level of anxiety will decrease  Description: Level of anxiety will decrease  7/11/2021 1358 by Elizabeth Sigala RN  Outcome: Completed     Problem: Pain:  Goal: Pain level will decrease  Description: Pain level will decrease  7/11/2021 1358 by Elizabeth Sigala RN  Outcome: Completed  Goal: Control of acute pain  Description: Control of acute pain  7/11/2021 1358 by Elizabeth Sigala RN  Outcome: Completed  Goal: Control of chronic pain  Description: Control of chronic pain  7/11/2021 1358 by Elizabeth Sigala RN  Outcome: Completed     Problem: VAGINAL DELIVERY - RECOVERY AND POST PARTUM  Goal: Vital signs are medically acceptable  7/11/2021 1553 by Pat Carmichael RN  Outcome: Ongoing  7/11/2021 1358 by Elizabeth Sigala RN  Outcome: Ongoing  Goal: Patient will remain free of falls  7/11/2021 1553 by Pat Carmichael RN  Outcome: Ongoing  7/11/2021 1358 by Elizabeth Sigala RN  Outcome: Ongoing  Goal: Fundus firm at midline  7/11/2021 1553 by Pat Carmichael RN  Outcome: Ongoing  7/11/2021 1358 by Elizabeth Sigala RN  Outcome: Ongoing  Goal: Moderate rubra without clots, no purulent discharge, no foul smelling lochia  7/11/2021 1553 by Pat Carmichael RN  Outcome: Ongoing  7/11/2021 1358 by Elizabeth Sigala RN  Outcome: Ongoing  Goal: Empties bladder  7/11/2021 1553 by Pat Carmichael RN  Outcome: Ongoing  7/11/2021 1358 by Elizabeth Sigala RN  Outcome: Ongoing  Goal: Verbalizes understanding of normal bowel function resumption  7/11/2021 1553 by Pat Carmichael RN  Outcome: Ongoing  7/11/2021 1358 by Elizabeth Sigala RN  Outcome: Ongoing  Goal: Edema will be absent or minimal  7/11/2021 1553 by Pat Carmichael RN  Outcome: Ongoing  7/11/2021 1358 by Elizabeth Sigala RN  Outcome: Ongoing  Goal: Breasts are soft with nipple integrity intact  7/11/2021 1553 by Pat Carmichael RN  Outcome: Ongoing  7/11/2021 1358 by Elizabeth Sigala RN  Outcome: Ongoing  Goal: Demonstrates appropriate breast

## 2021-07-11 NOTE — PROGRESS NOTES
Dr. Greyson Curtis notified of patient's last SVE of 4/80/-2. Ctxs palpating moderate and patient rating them a 5/10 on pain scale, occurring very 2-6 minutes. Orders received to restart pitocin and check patient again in a couple hours or as needed.

## 2021-07-11 NOTE — ANESTHESIA PRE PROCEDURE
Department of Anesthesiology  Preprocedure Note       Name:  Nuno Keys   Age:  24 y.o.  :  2000                                          MRN:  [id number]         Date:  2021      Surgeon: * No surgeons listed *    Procedure: * No procedures listed *    Medications prior to admission:   Prior to Admission medications    Medication Sig Start Date End Date Taking?  Authorizing Provider   Prenatal Multivit-Min-Fe-FA (PRE- PO) Take by mouth   Yes Historical Provider, MD       Current medications:    Current Facility-Administered Medications   Medication Dose Route Frequency Provider Last Rate Last Admin    promethazine (PHENERGAN) injection 12.5 mg  12.5 mg Intramuscular Q6H PRN Zainab Partida MD        miSOPROStol (CYTOTEC) pre-split tablet TABS 25 mcg  25 mcg Vaginal Q4H Zainab Partida MD   25 mcg at 07/10/21 1747    butorphanol (STADOL) injection 1 mg  1 mg Intravenous Q3H Zainab Partida MD   1 mg at 07/10/21 2318    lactated ringers infusion   Intravenous Continuous Zainab Partida  mL/hr at 21 0130 New Bag at 21 0130    sodium chloride flush 0.9 % injection 5-40 mL  5-40 mL Intravenous 2 times per day Zainab Partida MD        sodium chloride flush 0.9 % injection 5-40 mL  5-40 mL Intravenous PRN Zainab Partida MD        0.9 % sodium chloride infusion  25 mL Intravenous PRN Zainab Partida MD        lidocaine PF 1 % injection 30 mL  30 mL Other PRN Zainab Partida MD        acetaminophen (TYLENOL) tablet 650 mg  650 mg Oral Q4H PRN Zainab Partida MD        penicillin G potassium in d5w IVPB 2.5 Million Units  2.5 Million Units Intravenous Q4H Zainab Partida  mL/hr at 07/10/21 2234 2.5 Million Units at 07/10/21 2234    lactated ringers bolus  500 mL Intravenous PRN Zainab Partida MD        Or    lactated ringers bolus  1,000 mL Intravenous PRN Zainab Partida MD        oxytocin (PITOCIN) 30 units in 500 mL infusion  1-20 mahsa-units/min Intravenous Continuous Juan Alberto Yoon Federer, DO 1 mL/hr at 07/10/21 2340 1 mahsa-units/min at 07/10/21 2340    sodium chloride flush 0.9 % injection 5-40 mL  5-40 mL Intravenous 2 times per day Rosezena Rinks Federer, DO        sodium chloride flush 0.9 % injection 5-40 mL  5-40 mL Intravenous PRN Rosezena Rinks Federer, DO        0.9 % sodium chloride infusion  25 mL Intravenous PRN Rosezena Rinks Federer, DO        [facility] 4 mg  4 mg Intravenous Q6H PRN Rosezena Rinks Federer, DO   4 mg at 07/10/21 2140    diphenhydrAMINE (BENADRYL) injection 25 mg  25 mg Intravenous Q4H PRN Rosezena Rinks Federer, DO        acetaminophen (TYLENOL) tablet 650 mg  650 mg Oral Q4H PRN Rosezena Rinks Federer, DO        benzocaine-menthol (DERMOPLAST) 20-0.5 % spray   Topical PRN Rosezena Rinks Federer, DO        docusate sodium (COLACE) capsule 100 mg  100 mg Oral BID Rosezena Rinks Federer, DO         Facility-Administered Medications Ordered in Other Encounters   Medication Dose Route Frequency Provider Last Rate Last Admin    bupivacaine (PF) (MARCAINE) 0.25 % injection   Epidural PRN Lemon Pilling, APRN - CRNA   6 mL at 07/11/21 0128    sodium chloride 0.9 % 200 mL with fentaNYL 500 mcg, bupivacaine 0.5% 50 mL (OB) epidural   Epidural Continuous PRN Lemon Pilling, APRN - CRNA 15 mL/hr at 07/11/21 0132 15 mL/hr at 07/11/21 0132       Allergies:  No Known Allergies    Problem List:    Patient Active Problem List   Diagnosis Code    Prenatal care in third trimester Z34.93    GBS bacteriuria R82.71    Rh negative state in antepartum period O26.899, Z67.91    Normal labor O80, Z37.9       Past Medical History:  History reviewed. No pertinent past medical history. Past Surgical History:  History reviewed. No pertinent surgical history.     Social History:    Social History     Tobacco Use    Smoking status: Never Smoker    Smokeless tobacco: Never Used   Substance Use Topics    Alcohol use: Never                                Counseling given: Not Answered      Vital Signs (Current):   Vitals:    07/10/21 2330 07/11/21 0043 07/11/21 0111 07/11/21 0126   BP: 137/81 129/73 131/89 (!) 127/97   Pulse: 77 71 83 83   Resp: 16 16     Temp: 36.6 °C (97.9 °F)      Weight:       Height:                                                  BP Readings from Last 3 Encounters:   07/11/21 (!) 127/97   07/06/21 118/82   06/29/21 112/68       NPO Status: Time of last liquid consumption: 1500                        Time of last solid consumption: 1500                        Date of last liquid consumption: 07/09/21                        Date of last solid food consumption: 07/09/21    BMI:   Wt Readings from Last 3 Encounters:   07/09/21 258 lb (117 kg) (>99 %, Z= 2.58)*   07/06/21 258 lb 12.8 oz (117.4 kg) (>99 %, Z= 2.59)*   06/29/21 256 lb 9.6 oz (116.4 kg) (>99 %, Z= 2.57)*     * Growth percentiles are based on CDC (Girls, 2-20 Years) data. Body mass index is 40.41 kg/m². CBC:   Lab Results   Component Value Date    WBC 9.8 07/09/2021    RBC 4.93 07/09/2021    HGB 12.7 07/09/2021    HCT 38.3 07/09/2021    MCV 77.7 07/09/2021    RDW 15.4 07/09/2021     07/09/2021       CMP: No results found for: NA, K, CL, CO2, BUN, CREATININE, GFRAA, AGRATIO, LABGLOM, GLUCOSE, PROT, CALCIUM, BILITOT, ALKPHOS, AST, ALT    POC Tests: No results for input(s): POCGLU, POCNA, POCK, POCCL, POCBUN, POCHEMO, POCHCT in the last 72 hours.     Coags: No results found for: PROTIME, INR, APTT    HCG (If Applicable):   Lab Results   Component Value Date    PREGTESTUR Positive 11/18/2020        ABGs: No results found for: PHART, PO2ART, OJK1AJN, ELD7WSS, BEART, A0MHXOSN     Type & Screen (If Applicable):  No results found for: LABABO, LABRH    Drug/Infectious Status (If Applicable):  No results found for: HIV, HEPCAB    COVID-19 Screening (If Applicable): No results found for: COVID19        Anesthesia Evaluation   no history of anesthetic complications:   Airway: Mallampati: III  TM distance: >3 FB   Neck ROM: full  Mouth opening: > = 3 FB Dental: normal exam         Pulmonary:Negative Pulmonary ROS and normal exam                               Cardiovascular:Negative CV ROS                      Neuro/Psych:   Negative Neuro/Psych ROS              GI/Hepatic/Renal: Neg GI/Hepatic/Renal ROS            Endo/Other: Negative Endo/Other ROS                    Abdominal:   (+) obese,           Vascular: negative vascular ROS. Other Findings:             Anesthesia Plan      epidural     ASA 3 - emergent             Anesthetic plan and risks discussed with patient and spouse. Plan discussed with attending.                   JIGNA Nick - CRNA   7/11/2021

## 2021-07-11 NOTE — PROGRESS NOTES
Pt assisted by 2 staff members to restroom for first time get up. Pt denies dizziness or lightheadedness. Gait steady. Pt unable to void. Pericare done by pt with RN instruction. New ice pack, pad and panties put on pt. Gown changed. Hand hygiene done. Complete linen change done and comfort pad put on bed. Straight cath performed for 700 mL urine. Pt tolerated well.

## 2021-07-11 NOTE — PROGRESS NOTES
Labor Progress Note    Subjective:   Pt seen and examined. Doing well, no concerns/complaints. Comfortable now with epidural. +FM, no VB. Objective:  BP (!) 108/58   Pulse 71   Temp 97.3 °F (36.3 °C) Comment: axillary  Resp 16   Ht 5' 7\" (1.702 m)   Wt 258 lb (117 kg)   LMP 09/27/2020 (Exact Date)   BMI 40.41 kg/m²   Cervix: 5/90/-1, AROM for moderate amount of clear fluid    Fetal Monitoring Interpretation   FHTs: 140 baseline, mod keyon, +accels, neg decels  [place]: q2-4 min, pitocin at 2mU    Infusions:    lactated ringers 125 mL/hr at 07/11/21 0130    sodium chloride      oxytocin 2 mahsa-units/min (07/11/21 0155)    sodium chloride       ? Assessment/Plan:  1.  Intrapartum  - FHTs reassuring   - Continue expectant mgmt, titrate pitocin per protocol    Mariana Martinez MD

## 2021-07-11 NOTE — ANESTHESIA PROCEDURE NOTES
Epidural Block    Patient location during procedure: OB  Start time: 7/11/2021 1:14 AM  End time: 7/11/2021 1:32 AM  Reason for block: labor epidural  Staffing  Resident/CRNA: Christina Mendoza APRN - CRNA  Preanesthetic Checklist  Completed: patient identified, IV checked, site marked, risks and benefits discussed, surgical consent, monitors and equipment checked, pre-op evaluation, timeout performed, anesthesia consent given, oxygen available and patient being monitored  Epidural  Patient position: sitting  Prep: Betadine  Patient monitoring: continuous pulse ox and frequent blood pressure checks  Approach: midline  Location: lumbar (1-5) (L3-4)  Injection technique: MARLENE saline  Provider prep: mask and sterile gloves  Needle  Needle type: Tuohy   Needle gauge: 17 G  Needle length: 3.5 in  Catheter type: side hole  Catheter size: 19 G  Catheter at skin depth: 14 cm  Test dose: negative  Assessment  Sensory level: T8  Hemodynamics: stable  Attempts: 1  Additional Notes  Sitting, Sterile prep/drape, 1%Xylo at L3-4, 17ga Tuohy with MARLENE, 25ga Pencan for w/+CSF for DPE, Pencan removed, Catheter inserted, negative test dose, sterile dressing applied.

## 2021-07-11 NOTE — PROGRESS NOTES
Elana Pringleutant, CRNA at bedside for epidural placement. This RN present at bedside during procedure to care for patient.     0103-CRNA notified    0114-CRNA in room    0121-Epidural Catheter in    0123-Test dose

## 2021-07-11 NOTE — LACTATION NOTE
This note was copied from a baby's chart. Lactation Progress Note      Data:     RN requests f/u to offer support and assistance with second feeding after birth, states that she had to use a nipple shield to latch with the first feeding. MOB delivered today by  at 1016 AM at 41.0 weeks gestation. Primip breast feeder. Baby is already latched on the right breast with the nipple shield in cradle hold on consult, appears to be latched deeply onto the shield, with SRS and AS noted. MOB c/o slight pinching. Action: Introduced self as  [facility] on for this evening and offered much support and assessment of the latch. MOB agrees. Encouraged to break the suction of the latch since experiencing pinching, and offered to try without the nipple shield. MOB agrees. Infant unlatched from the breast, and shield removed. Several large drops of colostrum visible in the shield. Gave tips to promote good position of infant at the breast, showing cross cradle hold. Encouraged belly to belly and nose to nipple position of infant to the breast, with one of infant's hands on each side of the breast and infants bottom hugged close to mom. Shown how to support infant at the neck rather than the crown of the head and explained rationale. Encouraged to offer breast support using C shape hold, and instructed how to hand express colostrum for infant. Many large drops expressed easily and fed to infant. Infant with JUAN JOSE to the breast with few attempts, LC providing coaching during this time, explaining tips to encourage nipple to protrude and infant to obtain deep latch. LC offering support and coaching, but infant remained on/off at the breast, x 15 minutes and continues unable to sustain the latch. Education provided on how to apply the nipple shield properly to her nipple, tips to achieve JUAN JOSE with the shield and without.  Educated on the importance of a deep latch with and without shield throughout the feeding to prevent soreness/damage to

## 2021-07-11 NOTE — PROGRESS NOTES
Labor Progress Note    Subjective:   Pt seen and examined. Doing well, no concerns/complaints. Comfortable with epidural.    Objective:  /82   Pulse 82   Temp 98.5 °F (36.9 °C) Comment: axillary  Resp 16   Ht 5' 7\" (1.702 m)   Wt 258 lb (117 kg)   LMP 09/27/2020 (Exact Date)   SpO2 99%   BMI 40.41 kg/m²   Cervix: c/c/+1    Fetal Monitoring Interpretation   FHTs: 135 baseline, mod keyon, +accels, neg decels  [place]: q2-4 min, pitocin at 6mU    Infusions:    lactated ringers 125 mL/hr at 07/11/21 0130    sodium chloride      oxytocin 6 mahsa-units/min (07/11/21 0630)    sodium chloride       ? Assessment/Plan:  1.  Intrapartum  - FHTs reassuring   - Labor down for 30 minutes, then will start pushing at that time    Komal De Santiago MD

## 2021-07-12 LAB
ABO/RH: NORMAL
BASOPHILS ABSOLUTE: 0 K/UL (ref 0–0.2)
BASOPHILS RELATIVE PERCENT: 0.2 %
EOSINOPHILS ABSOLUTE: 0.1 K/UL (ref 0–0.6)
EOSINOPHILS RELATIVE PERCENT: 0.5 %
FETAL SCREEN: NORMAL
HCT VFR BLD CALC: 29.5 % (ref 36–48)
HEMOGLOBIN: 9.8 G/DL (ref 12–16)
LYMPHOCYTES ABSOLUTE: 2.6 K/UL (ref 1–5.1)
LYMPHOCYTES RELATIVE PERCENT: 24.8 %
MCH RBC QN AUTO: 26.1 PG (ref 26–34)
MCHC RBC AUTO-ENTMCNC: 33.2 G/DL (ref 31–36)
MCV RBC AUTO: 78.7 FL (ref 80–100)
MONOCYTES ABSOLUTE: 0.6 K/UL (ref 0–1.3)
MONOCYTES RELATIVE PERCENT: 5.3 %
NEUTROPHILS ABSOLUTE: 7.2 K/UL (ref 1.7–7.7)
NEUTROPHILS RELATIVE PERCENT: 69.2 %
PDW BLD-RTO: 15.4 % (ref 12.4–15.4)
PLATELET # BLD: 165 K/UL (ref 135–450)
PMV BLD AUTO: 10 FL (ref 5–10.5)
RBC # BLD: 3.74 M/UL (ref 4–5.2)
RHIG LOT NUMBER: NORMAL
WBC # BLD: 10.4 K/UL (ref 4–11)

## 2021-07-12 PROCEDURE — 6360000002 HC RX W HCPCS: Performed by: OBSTETRICS & GYNECOLOGY

## 2021-07-12 PROCEDURE — 85025 COMPLETE CBC W/AUTO DIFF WBC: CPT

## 2021-07-12 PROCEDURE — 86900 BLOOD TYPING SEROLOGIC ABO: CPT

## 2021-07-12 PROCEDURE — 99024 POSTOP FOLLOW-UP VISIT: CPT | Performed by: OBSTETRICS & GYNECOLOGY

## 2021-07-12 PROCEDURE — 86901 BLOOD TYPING SEROLOGIC RH(D): CPT

## 2021-07-12 PROCEDURE — 6370000000 HC RX 637 (ALT 250 FOR IP): Performed by: OBSTETRICS & GYNECOLOGY

## 2021-07-12 PROCEDURE — 85461 HEMOGLOBIN FETAL: CPT

## 2021-07-12 PROCEDURE — 36415 COLL VENOUS BLD VENIPUNCTURE: CPT

## 2021-07-12 PROCEDURE — 1220000000 HC SEMI PRIVATE OB R&B

## 2021-07-12 PROCEDURE — 96372 THER/PROPH/DIAG INJ SC/IM: CPT

## 2021-07-12 RX ADMIN — HUMAN RHO(D) IMMUNE GLOBULIN 300 MCG: 300 INJECTION, SOLUTION INTRAMUSCULAR at 15:40

## 2021-07-12 RX ADMIN — IBUPROFEN 800 MG: 800 TABLET, FILM COATED ORAL at 06:55

## 2021-07-12 RX ADMIN — IBUPROFEN 800 MG: 800 TABLET, FILM COATED ORAL at 16:08

## 2021-07-12 RX ADMIN — DOCUSATE SODIUM 100 MG: 100 CAPSULE, LIQUID FILLED ORAL at 19:58

## 2021-07-12 ASSESSMENT — PAIN SCALES - GENERAL: PAINLEVEL_OUTOF10: 0

## 2021-07-12 NOTE — PLAN OF CARE
Problem: Fluid Volume - Imbalance:  Goal: Absence of imbalanced fluid volume signs and symptoms  Description: Absence of imbalanced fluid volume signs and symptoms  Outcome: Ongoing  Goal: Absence of postpartum hemorrhage signs and symptoms  Description: Absence of postpartum hemorrhage signs and symptoms  Outcome: Ongoing     Problem: Pain - Acute:  Goal: Pain level will decrease  Description: Pain level will decrease  Outcome: Ongoing     Problem: VAGINAL DELIVERY - RECOVERY AND POST PARTUM  Goal: Vital signs are medically acceptable  7/12/2021 0754 by Susanne Melendez RN  Outcome: Ongoing  7/11/2021 1842 by Scarlett Girard RN  Outcome: Ongoing  Goal: Patient will remain free of falls  7/12/2021 0754 by Susanne Melendez RN  Outcome: Ongoing  7/11/2021 1842 by Scarlett Girard RN  Outcome: Ongoing  Goal: Fundus firm at midline  7/12/2021 0754 by Susanne Melendez RN  Outcome: Ongoing  7/11/2021 1842 by Scarlett Girard RN  Outcome: Ongoing  Goal: Moderate rubra without clots, no purulent discharge, no foul smelling lochia  7/12/2021 0754 by Susanne Melendez RN  Outcome: Ongoing  7/11/2021 1842 by Scarlett Girard RN  Outcome: Ongoing  Goal: Empties bladder  7/12/2021 0754 by Susanne Melendez RN  Outcome: Ongoing  7/11/2021 1842 by Scarlett Girard RN  Outcome: Ongoing  Goal: Verbalizes understanding of normal bowel function resumption  7/12/2021 0754 by Susanne Melendez RN  Outcome: Ongoing  7/11/2021 1842 by Scarlett Girard RN  Outcome: Ongoing  Goal: Edema will be absent or minimal  7/12/2021 0754 by Susanne Melendez RN  Outcome: Ongoing  7/11/2021 1842 by Scarlett Girard RN  Outcome: Ongoing  Goal: Breasts are soft with nipple integrity intact  7/12/2021 0754 by Susanne Melendez RN  Outcome: Ongoing  7/11/2021 1842 by Scarlett Girard RN  Outcome: Ongoing  Goal: Demonstrates appropriate breast feeding techniques  7/12/2021 0754 by Susanne Melendez RN  Outcome: Ongoing  7/11/2021 1842 by Scarlett Girard RN  Outcome: Ongoing  Goal: Appropriate behavior observed  7/12/2021 0754 by Zahida Vicente RN  Outcome: Ongoing  7/11/2021 1842 by Cesar Hernandez RN  Outcome: Ongoing  Goal: Positive Mother-Baby interactions are observed  7/12/2021 0754 by Zahida Vicente RN  Outcome: Ongoing  7/11/2021 1842 by Cesar Hernandez RN  Outcome: Ongoing  Goal: Perineum intact without discharge or hematoma  7/12/2021 0754 by Zahida Vicente RN  Outcome: Ongoing  7/11/2021 1842 by Cesar Hernandez RN  Outcome: Ongoing  Goal: Ambulates independently  7/12/2021 0754 by Zahida Vicente RN  Outcome: Ongoing  7/11/2021 1842 by Cesar Hernandez RN  Outcome: Ongoing     Problem: PAIN  Goal: Patient's pain/discomfort is manageable  7/12/2021 0754 by Zahida Vicente RN  Outcome: Ongoing  7/11/2021 1842 by Cesar Hernandez RN  Outcome: Ongoing     Problem: KNOWLEDGE DEFICIT  Goal: Patient/S.O. demonstrates understanding of disease process, treatment plan, medications, and discharge instructions.   7/12/2021 0754 by Zahida Vicente RN  Outcome: Ongoing  7/11/2021 1842 by Cesar Hernandez RN  Outcome: Ongoing     Problem: Discharge Planning:  Goal: Discharged to appropriate level of care  Description: Discharged to appropriate level of care  Outcome: Ongoing     Problem: Constipation:  Goal: Bowel elimination is within specified parameters  Description: Bowel elimination is within specified parameters  Outcome: Ongoing     Problem: Infection - Risk of, Puerperal Infection:  Goal: Will show no infection signs and symptoms  Description: Will show no infection signs and symptoms  Outcome: Ongoing     Problem: Mood - Altered:  Goal: Mood stable  Description: Mood stable  Outcome: Ongoing

## 2021-07-12 NOTE — PLAN OF CARE
Problem: Fluid Volume - Imbalance:  Goal: Absence of imbalanced fluid volume signs and symptoms  Description: Absence of imbalanced fluid volume signs and symptoms  7/12/2021 1935 by Misha Elmore RN  Outcome: Ongoing  7/12/2021 0754 by Edenilson Bernal RN  Outcome: Ongoing  Goal: Absence of postpartum hemorrhage signs and symptoms  Description: Absence of postpartum hemorrhage signs and symptoms  7/12/2021 1935 by Misha Elmore RN  Outcome: Ongoing  7/12/2021 0754 by Edenilson Bernal RN  Outcome: Ongoing     Problem: Pain - Acute:  Goal: Pain level will decrease  Description: Pain level will decrease  7/12/2021 1935 by Misha Elmore RN  Outcome: Ongoing  7/12/2021 0754 by Edenilson Bernal RN  Outcome: Ongoing     Problem: VAGINAL DELIVERY - RECOVERY AND POST PARTUM  Goal: Vital signs are medically acceptable  7/12/2021 1935 by Misha Elmore RN  Outcome: Ongoing  7/12/2021 0754 by Edenilson Bernal RN  Outcome: Ongoing  Goal: Patient will remain free of falls  7/12/2021 1935 by Misha Elmore RN  Outcome: Ongoing  7/12/2021 0754 by Edenilson Bernal RN  Outcome: Ongoing  Goal: Fundus firm at midline  7/12/2021 1935 by Misha Elmore RN  Outcome: Ongoing  7/12/2021 0754 by Edenilson Bernal RN  Outcome: Ongoing  Goal: Moderate rubra without clots, no purulent discharge, no foul smelling lochia  7/12/2021 1935 by Misha Elmore RN  Outcome: Ongoing  7/12/2021 0754 by Edenilson Bernal RN  Outcome: Ongoing  Goal: Empties bladder  7/12/2021 1935 by Misha Elmore RN  Outcome: Ongoing  7/12/2021 0754 by Edenilson Bernal RN  Outcome: Ongoing  Goal: Verbalizes understanding of normal bowel function resumption  7/12/2021 1935 by Misha Elmore RN  Outcome: Ongoing  7/12/2021 0754 by Edenilson Bernal RN  Outcome: Ongoing  Goal: Edema will be absent or minimal  7/12/2021 1935 by Misha Elmore RN  Outcome: Ongoing  7/12/2021 0754 by Edenilson Bernal RN  Outcome: Ongoing  Goal: Breasts are soft with nipple integrity intact  7/12/2021 1935 by Benny Pride RN  Outcome: Ongoing  7/12/2021 0754 by Oliver Del Real RN  Outcome: Ongoing  Goal: Demonstrates appropriate breast feeding techniques  7/12/2021 1935 by Benny Pride RN  Outcome: Ongoing  7/12/2021 0754 by Oliver Del Real RN  Outcome: Ongoing  Goal: Appropriate behavior observed  7/12/2021 1935 by Benny Pride, RN  Outcome: Ongoing  7/12/2021 0754 by Oliver Del Real RN  Outcome: Ongoing  Goal: Positive Mother-Baby interactions are observed  7/12/2021 1935 by Benny Pirde, RN  Outcome: Ongoing  7/12/2021 0754 by Oliver Del Real RN  Outcome: Ongoing  Goal: Perineum intact without discharge or hematoma  7/12/2021 1935 by Benny Pride, RN  Outcome: Ongoing  7/12/2021 0754 by Oliver Del Real RN  Outcome: Ongoing  Goal: Ambulates independently  7/12/2021 1935 by Benny Pride RN  Outcome: Ongoing  7/12/2021 0754 by Oliver Del Real RN  Outcome: Ongoing     Problem: PAIN  Goal: Patient's pain/discomfort is manageable  7/12/2021 1935 by Benny Pride RN  Outcome: Ongoing  7/12/2021 0754 by Oliver Del Real RN  Outcome: Ongoing     Problem: KNOWLEDGE DEFICIT  Goal: Patient/S.O. demonstrates understanding of disease process, treatment plan, medications, and discharge instructions.   7/12/2021 1935 by Benny Pride RN  Outcome: Ongoing  7/12/2021 0754 by Oliver Del Real RN  Outcome: Ongoing     Problem: Discharge Planning:  Goal: Discharged to appropriate level of care  Description: Discharged to appropriate level of care  7/12/2021 1935 by Benny Pride RN  Outcome: Ongoing  7/12/2021 0754 by Oliver Del Real RN  Outcome: Ongoing     Problem: Constipation:  Goal: Bowel elimination is within specified parameters  Description: Bowel elimination is within specified parameters  7/12/2021 1935 by Benny Pride RN  Outcome: Ongoing  7/12/2021 0754 by Oliver Del Real RN  Outcome: Ongoing     Problem:

## 2021-07-12 NOTE — PROGRESS NOTES
Department of Obstetrics and Gynecology  Labor and Delivery  Attending Post Partum Progress Note      SUBJECTIVE:  23 y/o  female S/P uncomplicated Vaginal Delivery. The pregnancy was complicated by Rh negative, GBBS positive, teen pregnancy and maternal weight. No current complaints are noted including headache, change in vision, fever, chills, chest pain, shortness of breath, nausea, vomiting, diarrhea or constipation. The patient denies any urinary complaints or calf tenderness. Lochia is described as mild. The patient plans to breastfeed. Vitals:  /61   Pulse 84   Temp 98 °F (36.7 °C)   Resp 16   Ht 5' 7\" (1.702 m)   Wt 258 lb (117 kg)   LMP 2020 (Exact Date)   SpO2 99%   Breastfeeding Unknown   BMI 40.41 kg/m²     CONSTITUTIONAL:  awake, alert, cooperative, no apparent distress, and appears stated age  LUNGS:  No increased work of breathing, good air exchange, clear to auscultation bilaterally, no crackles or wheezing  ABDOMEN:  soft, non-distended and non-tender.  Fundus firm below level of umbilicus  MUSCULOSKELETAL:  full range of motion noted  NEUROLOGIC:  Mental Status Exam:  Level of Alertness:   awake  Orientation:   person, place, time  Memory:   normal  Fund of Knowledge:  normal  Attention/Concentration:  normal  Language:  normal  SKIN:  normal skin color, texture, turgor  EXTREMITIES: no C/C, +1 edema bilateral     DATA:      2021  7:53 AM [facility] Results From Soft (Epic Adt)    Component Value Ref Range & Units Status Collected Lab   WBC 10.4  4.0 - 11.0 K/uL Final 2021  6:52 AM [facility] Lab   RBC 3.74Low   4.00 - 5.20 M/uL Final 2021  6:52 AM [facility] Lab   Hemoglobin 9.8Low   12.0 - 16.0 g/dL Final 2021  6:52 AM [facility] Lab   Hematocrit 29.5Low   36.0 - 48.0 % Final 2021  6:52 AM [facility] Lab   MCV 78.7Low   80.0 - 100.0 fL Final 2021  6:52 AM [address] Lab   MCH 26.1  26.0 - 34.0 pg Final 07/12/2021  6:52 AM [facility]   MCHC 33.2  31.0 - 36.0 g/dL Final 07/12/2021  6:52 AM [facility]   RDW 15.4  12.4 - 15.4 % Final 07/12/2021  6:52 AM [facility]   Platelets 640  168 - 450 K/uL Final 07/12/2021  6:52 AM [address] Lab   MPV 10.0  5.0 - 10.5 fL Final 07/12/2021  6:52 AM [facility]   Neutrophils % 69.2  % Final 07/12/2021  6:52 AM [facility]   Lymphocytes % 24.8  % Final 07/12/2021  6:52 AM [address] Lab   Monocytes % 5.3  % Final 07/12/2021  6:52 AM [address] Lab   Eosinophils % 0.5  % Final 07/12/2021  6:52 AM [facility]   Basophils % 0.2  % Final 07/12/2021  6:52 AM [address] Lab   Neutrophils Absolute 7.2  1.7 - 7.7 K/uL Final 07/12/2021  6:52 AM [facility]   Lymphocytes Absolute 2.6  1.0 - 5.1 K/uL Final 07/12/2021  6:52 AM [facility]   Monocytes Absolute 0.6  0.0 - 1.3 K/uL Final 07/12/2021  6:52 AM [facility]   Eosinophils Absolute 0.1  0.0 - 0.6 K/uL Final 07/12/2021  6:52 AM [facility]   Basophils Absolute 0.0  0.0 - 0.2 K/uL Final 07/12/2021  6:52 AM [facility]   Testing Performed By    Stephen Mckeon Name Director Address Valid Date Range   [address] [place] [facility] [address] M.D. Ferny Joshi [id number] [birth year] 0807-Present         ASSESSMENT & PLAN:    Impression:  S/P Vaginal Delivery  GBBS positive  Rh negative  Anemia  Maternal weight      Plan:   See orders and Patient Instructions  Home in AM

## 2021-07-12 NOTE — ANESTHESIA POSTPROCEDURE EVALUATION
Department of Anesthesiology  Postprocedure Note    Patient: Sadie Mckinney  MRN: [id number]  YOB: 2000  Date of evaluation: 7/12/2021  Time:  12:14 PM     Procedure Summary     Date: 07/11/21 Room / Location:     Anesthesia Start: 0114 Anesthesia Stop: 0961    Procedure: Labor Analgesia Diagnosis:     Scheduled Providers:  Responsible Provider: Tameka Aguirre MD    Anesthesia Type: epidural ASA Status: 3 - Emergent          Anesthesia Type: epidural    Nigel Phase I: Nigel Score: 9    Nigel Phase II: Nigel Score: 10    Last vitals: Reviewed and per EMR flowsheets.        Anesthesia Post Evaluation    Patient location during evaluation: bedside  Patient participation: complete - patient participated  Level of consciousness: awake and alert  Nausea & Vomiting: no nausea and no vomiting  Complications: no  Cardiovascular status: hemodynamically stable  Hydration status: stable

## 2021-07-13 VITALS
RESPIRATION RATE: 18 BRPM | HEIGHT: 67 IN | BODY MASS INDEX: 40.49 KG/M2 | TEMPERATURE: 97.9 F | SYSTOLIC BLOOD PRESSURE: 135 MMHG | HEART RATE: 80 BPM | OXYGEN SATURATION: 99 % | DIASTOLIC BLOOD PRESSURE: 72 MMHG | WEIGHT: 258 LBS

## 2021-07-13 PROCEDURE — 6370000000 HC RX 637 (ALT 250 FOR IP): Performed by: OBSTETRICS & GYNECOLOGY

## 2021-07-13 PROCEDURE — 99024 POSTOP FOLLOW-UP VISIT: CPT | Performed by: OBSTETRICS & GYNECOLOGY

## 2021-07-13 RX ORDER — IBUPROFEN 800 MG/1
800 TABLET ORAL EVERY 8 HOURS PRN
Qty: 40 TABLET | Refills: 1 | Status: SHIPPED | OUTPATIENT
Start: 2021-07-13

## 2021-07-13 RX ORDER — FERROUS SULFATE 325(65) MG
325 TABLET ORAL
Qty: 30 TABLET | Refills: 3 | Status: SHIPPED | OUTPATIENT
Start: 2021-07-13

## 2021-07-13 RX ORDER — PSEUDOEPHEDRINE HCL 30 MG
100 TABLET ORAL 2 TIMES DAILY PRN
Qty: 30 CAPSULE | Refills: 1 | Status: SHIPPED | OUTPATIENT
Start: 2021-07-13

## 2021-07-13 RX ADMIN — DOCUSATE SODIUM 100 MG: 100 CAPSULE, LIQUID FILLED ORAL at 09:23

## 2021-07-13 RX ADMIN — FERROUS SULFATE TAB 325 MG (65 MG ELEMENTAL FE) 325 MG: 325 (65 FE) TAB at 09:23

## 2021-07-13 RX ADMIN — IBUPROFEN 800 MG: 800 TABLET, FILM COATED ORAL at 09:23

## 2021-07-13 RX ADMIN — IBUPROFEN 800 MG: 800 TABLET, FILM COATED ORAL at 00:00

## 2021-07-13 ASSESSMENT — PAIN SCALES - GENERAL
PAINLEVEL_OUTOF10: 0
PAINLEVEL_OUTOF10: 0

## 2021-07-13 NOTE — PROGRESS NOTES
Post Partum Progress Note    Subjective:  Dorota Mendoza is a 24 y.o. Pearlene Greybull status-post  uncomplicated Vaginal Delivery. The pregnancy was complicated by Rh negative, GBBS positive, teen pregnancy and maternal weight. Patient is doing well with no concerns. Pain is well controlled with current regimen. Lochia mild. Tolerating regular diet without difficulty. Ambulating without difficulty, denies dizziness on standing. Voiding without difficulty. She is breast feeding. Denies chest pain, SOB, or leg pain. Objective:  /60   Pulse 79   Temp 98 °F (36.7 °C)   Resp 16   Ht 5' 7\" (1.702 m)   Wt 258 lb (117 kg)   LMP 09/27/2020 (Exact Date)   SpO2 99%   Breastfeeding Unknown   BMI 40.41 kg/m²     GENERAL APPEARANCE: alert, well appearing, in no apparent distress  LUNGS: clear to auscultation, no wheezes, rales or rhonchi, symmetric air entry  HEART: regular rate and rhythm, no murmurs  ABDOMEN POSTPARTUM: benign non-tender, without masses or organomegaly palpable, fundus firm at U  EXTREMITIES: no redness or tenderness in the calves or thighs, no edema    No intake/output data recorded.     Pertinent Labs:   Admission on 07/09/2021   Component Date Value Ref Range Status    WBC 07/09/2021 9.8  4.0 - 11.0 K/uL Final    RBC 07/09/2021 4.93  4.00 - 5.20 M/uL Final    Hemoglobin 07/09/2021 12.7  12.0 - 16.0 g/dL Final    Hematocrit 07/09/2021 38.3  36.0 - 48.0 % Final    MCV 07/09/2021 77.7* 80.0 - 100.0 fL Final    MCH 07/09/2021 25.8* 26.0 - 34.0 pg Final    MCHC 07/09/2021 33.2  31.0 - 36.0 g/dL Final    RDW 07/09/2021 15.4  12.4 - 15.4 % Final    Platelets 50/30/9341 190  135 - 450 K/uL Final    MPV 07/09/2021 9.5  5.0 - 10.5 fL Final    Neutrophils % 07/09/2021 73.6  % Final    Lymphocytes % 07/09/2021 20.0  % Final    Monocytes % 07/09/2021 5.3  % Final    Eosinophils % 07/09/2021 0.3  % Final    Basophils % 07/09/2021 0.8  % Final    Neutrophils Absolute 07/09/2021 7.2  1.7 - 7.7 K/uL Final    Lymphocytes Absolute 07/09/2021 2.0  1.0 - 5.1 K/uL Final    Monocytes Absolute 07/09/2021 0.5  0.0 - 1.3 K/uL Final    Eosinophils Absolute 07/09/2021 0.0  0.0 - 0.6 K/uL Final    Basophils Absolute 07/09/2021 0.1  0.0 - 0.2 K/uL Final    ABO/Rh 07/09/2021 B NEG   Final    Antibody Screen 07/09/2021 NEG   Final    Amphetamine Screen, Urine 07/09/2021 Neg  Negative <1000ng/mL Final    Barbiturate Screen, Ur 07/09/2021 Neg  Negative <200 ng/mL Final    Benzodiazepine Screen, Urine 07/09/2021 Neg  Negative <200 ng/mL Final    Cannabinoid Scrn, Ur 07/09/2021 Neg  Negative <50 ng/mL Final    Cocaine Metabolite Screen, Urine 07/09/2021 Neg  Negative <300 ng/mL Final    Opiate Scrn, Ur 07/09/2021 Neg  Negative <300 ng/mL Final    Comment: \"Therapeutic levels of pain medication, especially oxycontin and synthetic  opioids, may not be detected by this Methodology. Pain management screen  panel  Drug panel-PM-Hi Res Ur, Interp (PAIN) should be considered for drug  monitoring \".  PCP Screen, Urine 07/09/2021 Neg  Negative <25 ng/mL Final    Methadone Screen, Urine 07/09/2021 Neg  Negative <300 ng/mL Final    Propoxyphene Scrn, Ur 07/09/2021 Neg  Negative <300 ng/mL Final    Oxycodone Urine 07/09/2021 Neg  Negative <100 ng/ml Final    Buprenorphine Urine 07/09/2021 Neg  Negative <5 ng/ml Final    pH, UA 07/09/2021 6.0   Final    Comment: Urine pH less than 5.0 or greater than 8.0 may indicate sample adulteration. Another sample should be collected if clinically  indicated.  Drug Screen Comment: 07/09/2021 see below   Final    Comment: This method is a screening test to detect only these drug  classes as part of a medical workup. Confirmatory testing  by another method should be ordered if clinically indicated.       Total Syphillis IgG/IgM 07/09/2021 Non-Reactive  Non-reactive Final    WBC 07/12/2021 10.4  4.0 - 11.0 K/uL Final    RBC 07/12/2021 3.74* 4.00 - 5.20 M/uL Final    Hemoglobin 07/12/2021 9.8* 12.0 - 16.0 g/dL Final    Hematocrit 07/12/2021 29.5* 36.0 - 48.0 % Final    MCV 07/12/2021 78.7* 80.0 - 100.0 fL Final    MCH 07/12/2021 26.1  26.0 - 34.0 pg Final    MCHC 07/12/2021 33.2  31.0 - 36.0 g/dL Final    RDW 07/12/2021 15.4  12.4 - 15.4 % Final    Platelets 22/41/6212 165  135 - 450 K/uL Final    MPV 07/12/2021 10.0  5.0 - 10.5 fL Final    Neutrophils % 07/12/2021 69.2  % Final    Lymphocytes % 07/12/2021 24.8  % Final    Monocytes % 07/12/2021 5.3  % Final    Eosinophils % 07/12/2021 0.5  % Final    Basophils % 07/12/2021 0.2  % Final    Neutrophils Absolute 07/12/2021 7.2  1.7 - 7.7 K/uL Final    Lymphocytes Absolute 07/12/2021 2.6  1.0 - 5.1 K/uL Final    Monocytes Absolute 07/12/2021 0.6  0.0 - 1.3 K/uL Final    Eosinophils Absolute 07/12/2021 0.1  0.0 - 0.6 K/uL Final    Basophils Absolute 07/12/2021 0.0  0.0 - 0.2 K/uL Final    ABO/Rh 07/12/2021 B NEG   Final    Fetal Screen 07/12/2021 NEG   Final    RHIG LOT NUMBER 07/12/2021 RhImmuneGlobulin    [id number]          issued       1 vial  07/12/21 15:01   Final       Assessment/Plan:  Junior Membreno is a 24 y.o. Ragdoyle Jamesler status-post  uncomplicated Vaginal Delivery. 1. PPD #2: doing well, routine care  - d/c with iron (PP Hgb 9.8, asymptomatic)  2. Infant: F, 8/9, 3620g  3.   Labs: B-/ab-, RI, GBS+  - s/p rhogam  - s/p PCN in labor    Dispo: d/c home today    Donita Valverde MD  [facility]

## 2021-07-13 NOTE — FLOWSHEET NOTE
Lactation Progress Note      Data:   F/U on 1/0 breast feeder who is hoping to be d/c home today. Mob states that baby cluster fed last night and is feeding well. Output and wt loss are WNL. Action: Discharge teaching done; what to expect in the first few days of life, to feed baby at first sign of hunger cue for total of 8-12 times per day after the first DOL, how to properly position and latch baby, how to know baby is getting enough, engorgement prevention and treatment, avoiding bottles and pacifiers, pumping and community resources. Encouraged to call [de-identified] or Outpatient [facility] for f/u prn. Response: Verbalized understanding and comfortable with breast feeding for d/c.

## 2021-07-13 NOTE — PROGRESS NOTES
Report received from LETICIA Ralph RN. Bedside report given. Introduced myself to pt as her RN for the day. I put my name and phone number on the white board and showed pt how to use her room phone to get a hold of me. Pt was given her plan of care for the day. Call light within reach. Bed in lowest position and wheels are locked. Pt verbalized understanding and denies any further needs at this time. Continue to monitor.

## 2021-07-13 NOTE — PLAN OF CARE
Problem: Fluid Volume - Imbalance:  Goal: Absence of imbalanced fluid volume signs and symptoms  Description: Absence of imbalanced fluid volume signs and symptoms  7/13/2021 0909 by Brisa Khoury RN  Outcome: Ongoing  7/12/2021 1935 by Nayely Madrid RN  Outcome: Ongoing  Goal: Absence of postpartum hemorrhage signs and symptoms  Description: Absence of postpartum hemorrhage signs and symptoms  7/13/2021 0909 by Brisa Khoury RN  Outcome: Ongoing  7/12/2021 1935 by Nayely Madrid RN  Outcome: Ongoing     Problem: Pain - Acute:  Goal: Pain level will decrease  Description: Pain level will decrease  7/13/2021 0909 by Brisa Khoury RN  Outcome: Ongoing  7/12/2021 1935 by Nayely Madrid RN  Outcome: Ongoing     Problem: VAGINAL DELIVERY - RECOVERY AND POST PARTUM  Goal: Vital signs are medically acceptable  7/13/2021 0909 by Brisa Khoury RN  Outcome: Ongoing  7/12/2021 1935 by Nayely Madrid RN  Outcome: Ongoing  Goal: Patient will remain free of falls  7/13/2021 0909 by Brisa Khoury RN  Outcome: Ongoing  7/12/2021 1935 by Nayely Madrid RN  Outcome: Ongoing  Goal: Fundus firm at midline  7/13/2021 0909 by Brisa Khoury RN  Outcome: Ongoing  7/12/2021 1935 by Nayely Madrid RN  Outcome: Ongoing  Goal: Moderate rubra without clots, no purulent discharge, no foul smelling lochia  7/13/2021 0909 by Brisa Khoury RN  Outcome: Ongoing  7/12/2021 1935 by Nayely Madrid RN  Outcome: Ongoing  Goal: Empties bladder  7/13/2021 0909 by Brisa Khoury RN  Outcome: Ongoing  7/12/2021 1935 by Nayely Madrid RN  Outcome: Ongoing  Goal: Verbalizes understanding of normal bowel function resumption  7/13/2021 0909 by Brisa Khoury RN  Outcome: Ongoing  7/12/2021 1935 by Nayely Madrid RN  Outcome: Ongoing  Goal: Edema will be absent or minimal  7/13/2021 0909 by Brisa Khoury RN  Outcome: Ongoing  7/12/2021 1935 by Onita Meckel Alphonso Florez RN  Outcome: Ongoing  Goal: Breasts are soft with nipple integrity intact  7/13/2021 0909 by Hubert Lackey RN  Outcome: Ongoing  7/12/2021 1935 by Rachell Zhou RN  Outcome: Ongoing  Goal: Demonstrates appropriate breast feeding techniques  7/13/2021 0909 by Hubert Lackey RN  Outcome: Ongoing  7/12/2021 1935 by Rachell Zhou RN  Outcome: Ongoing  Goal: Appropriate behavior observed  7/13/2021 0909 by Hubert Lackey RN  Outcome: Ongoing  7/12/2021 1935 by Rachell Zhou RN  Outcome: Ongoing  Goal: Positive Mother-Baby interactions are observed  7/13/2021 0909 by Hubert Lackey RN  Outcome: Ongoing  7/12/2021 1935 by Rachell Zhou RN  Outcome: Ongoing  Goal: Perineum intact without discharge or hematoma  7/13/2021 0909 by Hubert Lackey RN  Outcome: Ongoing  7/12/2021 1935 by Rachell Zhou RN  Outcome: Ongoing  Goal: Ambulates independently  7/13/2021 0909 by Hubert Lackey RN  Outcome: Ongoing  7/12/2021 1935 by Rachell Zhou RN  Outcome: Ongoing     Problem: PAIN  Goal: Patient's pain/discomfort is manageable  7/13/2021 0909 by Hubert Lackey RN  Outcome: Ongoing  7/12/2021 1935 by Rachell Zhou RN  Outcome: Ongoing     Problem: KNOWLEDGE DEFICIT  Goal: Patient/S.O. demonstrates understanding of disease process, treatment plan, medications, and discharge instructions.   7/13/2021 0909 by Hubert Lackey RN  Outcome: Ongoing  7/12/2021 1935 by Rachell Zhou RN  Outcome: Ongoing     Problem: Discharge Planning:  Goal: Discharged to appropriate level of care  Description: Discharged to appropriate level of care  7/13/2021 0909 by Hubert Lackey RN  Outcome: Ongoing  7/12/2021 1935 by Rachell Zhou RN  Outcome: Ongoing     Problem: Constipation:  Goal: Bowel elimination is within specified parameters  Description: Bowel elimination is within specified parameters  7/13/2021 0909 by Hubert Lackey RN  Outcome: Ongoing  7/12/2021 1935 by Kelly Mendoza RN  Outcome: Ongoing     Problem: Infection - Risk of, Puerperal Infection:  Goal: Will show no infection signs and symptoms  Description: Will show no infection signs and symptoms  7/13/2021 0909 by Abi Modi RN  Outcome: Ongoing  7/12/2021 1935 by Kelly Mendoza RN  Outcome: Ongoing     Problem: Mood - Altered:  Goal: Mood stable  Description: Mood stable  7/13/2021 0909 by Abi Modi RN  Outcome: Ongoing  7/12/2021 1935 by Kelly Mendoza RN  Outcome: Ongoing

## 2021-07-13 NOTE — PLAN OF CARE
Problem: Fluid Volume - Imbalance:  Goal: Absence of imbalanced fluid volume signs and symptoms  Description: Absence of imbalanced fluid volume signs and symptoms  7/13/2021 0910 by Vanessa Alvarenga RN  Outcome: Completed  7/13/2021 0909 by Vanessa Alvarenga RN  Outcome: Ongoing  7/12/2021 1935 by Lucy Lua RN  Outcome: Ongoing  Goal: Absence of postpartum hemorrhage signs and symptoms  Description: Absence of postpartum hemorrhage signs and symptoms  7/13/2021 0910 by Vanessa Alvarenga RN  Outcome: Completed  7/13/2021 0909 by Vanessa Alvarenga RN  Outcome: Ongoing  7/12/2021 1935 by Lucy Lua RN  Outcome: Ongoing     Problem: Pain - Acute:  Goal: Pain level will decrease  Description: Pain level will decrease  7/13/2021 0910 by Vanessa Alvarenga RN  Outcome: Completed  7/13/2021 0909 by Vanessa Alvarenga RN  Outcome: Ongoing  7/12/2021 1935 by Lucy Lua RN  Outcome: Ongoing     Problem: VAGINAL DELIVERY - RECOVERY AND POST PARTUM  Goal: Vital signs are medically acceptable  7/13/2021 0910 by Vanessa Alvarenga RN  Outcome: Completed  7/13/2021 0909 by Vanessa Alvarenga RN  Outcome: Ongoing  7/12/2021 1935 by Lucy Lua RN  Outcome: Ongoing  Goal: Patient will remain free of falls  7/13/2021 0910 by Vanessa Alvarenga RN  Outcome: Completed  7/13/2021 0909 by Vanessa Alvarenga RN  Outcome: Ongoing  7/12/2021 1935 by Lucy Lua RN  Outcome: Ongoing  Goal: Fundus firm at midline  7/13/2021 0910 by Vanessa Alvarenga RN  Outcome: Completed  7/13/2021 0909 by Vanessa Alvarenga RN  Outcome: Ongoing  7/12/2021 1935 by Lucy Lua RN  Outcome: Ongoing  Goal: Moderate rubra without clots, no purulent discharge, no foul smelling lochia  7/13/2021 0910 by Vanessa Alvarenga RN  Outcome: Completed  7/13/2021 0909 by Vanessa Alvarenga RN  Outcome: Ongoing  7/12/2021 1935 by Lucy Lua RN  Outcome: Ongoing  Goal: Empties bladder  7/13/2021 1380 by Nanette Dunn RN  Outcome: Completed  7/13/2021 0909 by Nanette Dunn RN  Outcome: Ongoing  7/12/2021 1935 by Glenis Fuller RN  Outcome: Ongoing  Goal: Verbalizes understanding of normal bowel function resumption  7/13/2021 0910 by Nanette Dunn RN  Outcome: Completed  7/13/2021 0909 by Nanette Dunn RN  Outcome: Ongoing  7/12/2021 1935 by Glenis Fuller RN  Outcome: Ongoing  Goal: Edema will be absent or minimal  7/13/2021 0910 by Nanette Dunn RN  Outcome: Completed  7/13/2021 0909 by Nanette Dunn RN  Outcome: Ongoing  7/12/2021 1935 by Glenis Fuller RN  Outcome: Ongoing  Goal: Breasts are soft with nipple integrity intact  7/13/2021 0910 by Nanette Dunn RN  Outcome: Completed  7/13/2021 0909 by Nanette Dunn RN  Outcome: Ongoing  7/12/2021 1935 by Glenis Fuller RN  Outcome: Ongoing  Goal: Demonstrates appropriate breast feeding techniques  7/13/2021 0910 by Nanette Dunn RN  Outcome: Completed  7/13/2021 0909 by Nanette Dunn RN  Outcome: Ongoing  7/12/2021 1935 by Glenis Fuller RN  Outcome: Ongoing  Goal: Appropriate behavior observed  7/13/2021 0910 by Nanette Dunn RN  Outcome: Completed  7/13/2021 0909 by Nanette Dunn RN  Outcome: Ongoing  7/12/2021 1935 by Glenis Fuller RN  Outcome: Ongoing  Goal: Positive Mother-Baby interactions are observed  7/13/2021 0910 by Nanette Dunn RN  Outcome: Completed  7/13/2021 0909 by Nanette Dunn RN  Outcome: Ongoing  7/12/2021 1935 by Glenis Fuller RN  Outcome: Ongoing  Goal: Perineum intact without discharge or hematoma  7/13/2021 0910 by Nanette Dunn RN  Outcome: Completed  7/13/2021 0909 by Nanette Dunn RN  Outcome: Ongoing  7/12/2021 1935 by Glenis Fuller RN  Outcome: Ongoing  Goal: Ambulates independently  7/13/2021 0910 by Nanette Dunn RN  Outcome: Completed  7/13/2021 0909 by Nanette Dunn RN  Outcome: Ongoing  7/12/2021 1935 by Benny Pride RN  Outcome: Ongoing     Problem: PAIN  Goal: Patient's pain/discomfort is manageable  7/13/2021 0910 by Merline Perone, RN  Outcome: Completed  7/13/2021 0909 by Merline Perone, RN  Outcome: Ongoing  7/12/2021 1935 by Benny Pride RN  Outcome: Ongoing     Problem: KNOWLEDGE DEFICIT  Goal: Patient/S.O. demonstrates understanding of disease process, treatment plan, medications, and discharge instructions.   7/13/2021 0910 by Merline Perone, RN  Outcome: Completed  7/13/2021 0909 by Merline Perone, RN  Outcome: Ongoing  7/12/2021 1935 by Benny Pride RN  Outcome: Ongoing     Problem: Discharge Planning:  Goal: Discharged to appropriate level of care  Description: Discharged to appropriate level of care  7/13/2021 0910 by Merline Perone, RN  Outcome: Completed  7/13/2021 0909 by Merline Perone, RN  Outcome: Ongoing  7/12/2021 1935 by Benny Pride RN  Outcome: Ongoing     Problem: Constipation:  Goal: Bowel elimination is within specified parameters  Description: Bowel elimination is within specified parameters  7/13/2021 0910 by Merline Perone, RN  Outcome: Completed  7/13/2021 0909 by Merline Perone, RN  Outcome: Ongoing  7/12/2021 1935 by Benny Pride RN  Outcome: Ongoing     Problem: Infection - Risk of, Puerperal Infection:  Goal: Will show no infection signs and symptoms  Description: Will show no infection signs and symptoms  7/13/2021 0910 by Merline Perone, RN  Outcome: Completed  7/13/2021 0909 by Merline Perone, RN  Outcome: Ongoing  7/12/2021 1935 by Benny Pride RN  Outcome: Ongoing     Problem: Mood - Altered:  Goal: Mood stable  Description: Mood stable  7/13/2021 0910 by Merline Perone, RN  Outcome: Completed  7/13/2021 0909 by Merline Perone, RN  Outcome: Ongoing  7/12/2021 1935 by Benny Pride RN  Outcome: Ongoing

## 2021-07-13 NOTE — PROGRESS NOTES
Discharge Phone Call Log  Patient Name: Mari Johnson     [facility] Care Provider: Lashay Pablo DO Discharge Date: 2021    Disposition of baby:    Phone Number: [phone] (home)     Attempts to Contact:  Date:    Nurse  Date:    Nurse  Date:    Nurse    1. Now that you are at home is your pain being well controlled? Y/N   What pain reducing measures are you using? ____________________________________        Information for the patient's :  Babar Moralez [[id number]]   Delivery Method: Vaginal, Spontaneous     2. Are you currently  having any infant feeding issues? Y/N _____________________________ If yes, please explain: __________________________________________________________________  3. If breastfeeding, were you satisfied with the breastfeeding support services offered? Y/N  4.  Have you had to supplement? Y/N If yes, please explain: _____________________________________________________       Did you supplement while in the hospital, or begin formula supplementation at home?________________________________________  5. Did your OB provider offer you information about the benefits of breastfeeding during your prenatal visits? Y/N  6.  Have you made or have you already had your first appointment with the baby's doctor? Y/N If no, do you know when to schedule it? Y/N   7.  Have you scheduled your follow-up appointment? Y/N  If no, do you know when to schedule it? Y/N  8. Did staff discuss safe sleep during your stay? Y/N  Did you see the wall cling posted in your room explaining how to keep you and your baby safe? Y/N  10. Did your nurses and physicians include you in the plan of care, communicating with you respectfully? Y/N If no, please explain __________________________  11. Is there anyone in particular you would like to mention who provided care for you? ________________________________  12. Did your discharge occur in a timely manner?   Y/N If no, please explain __________________________  13. Do you have any other questions or concerns I can address today?  Y/N  __________________________________________________      Teaching During interview :_____________________________________________  ___________________________RN       Date:______________Time:________________

## 2021-07-13 NOTE — DISCHARGE SUMMARY
Department of Obstetrics and Gynecology  Postpartum Discharge Summary      Admit Date: 2021    Admit Diagnosis: Normal labor [O80, Z37.9]    Discharge Date: 21    Condition at Discharge: good    Discharge Diagnoses: spontaneous vaginal delivery    Discharge Disposition:  Home    Service: Obstetrics    Postpartum complications: none     Hospital Course: uncomplicated     Data:  Information for the patient's :  Bronson Troy [[id number]]        Weight   Information for the patient's :  Bronson Troy [[id number]]        Apgars   Information for the patient's :  Bronson Troy [[id number]]         Disposition of Baby:  Home with mother      Current Discharge Medication List      START taking these medications    Details   ibuprofen (ADVIL;MOTRIN) 800 MG tablet Take 1 tablet by mouth every 8 hours as needed for Pain  Qty: 40 tablet, Refills: 1      ferrous sulfate (IRON 325) 325 (65 Fe) MG tablet Take 1 tablet by mouth daily (with breakfast)  Qty: 30 tablet, Refills: 3      docusate sodium (COLACE, DULCOLAX) 100 MG CAPS Take 100 mg by mouth 2 times daily as needed for Constipation  Qty: 30 capsule, Refills: 1         CONTINUE these medications which have NOT CHANGED    Details   Prenatal Multivit-Min-Fe-FA (PRE- PO) Take by mouth             Electronically signed by Mark Arroyo MD on 2021 at 8:14 AM

## 2021-07-13 NOTE — PROGRESS NOTES
RN reviewed discharge instructions with pt an fob. All questions answered. pt gave verbal and written understanding of discharge instructions. ID bands checked. Infant's ID band and Mother's matching ID bands removed and taped to discharge instruction sheet, the mother verified as correct and witnessed by RN. Umbilical clamp and HUGS tag removed.

## 2021-07-28 ENCOUNTER — POSTPARTUM VISIT (OUTPATIENT)
Dept: OBGYN CLINIC | Age: 21
End: 2021-07-28

## 2021-07-28 VITALS
WEIGHT: 242.6 LBS | HEART RATE: 85 BPM | TEMPERATURE: 98.3 F | SYSTOLIC BLOOD PRESSURE: 124 MMHG | DIASTOLIC BLOOD PRESSURE: 80 MMHG | BODY MASS INDEX: 38 KG/M2

## 2021-07-28 PROBLEM — R82.71 GBS BACTERIURIA: Status: RESOLVED | Noted: 2020-12-15 | Resolved: 2021-07-28

## 2021-07-28 PROBLEM — Z34.93 PRENATAL CARE IN THIRD TRIMESTER: Status: RESOLVED | Noted: 2020-12-15 | Resolved: 2021-07-28

## 2021-07-28 PROBLEM — Z37.9 NORMAL LABOR: Status: RESOLVED | Noted: 2021-07-09 | Resolved: 2021-07-28

## 2021-07-28 PROCEDURE — 0503F POSTPARTUM CARE VISIT: CPT | Performed by: OBSTETRICS & GYNECOLOGY

## 2021-07-28 NOTE — PROGRESS NOTES
Postpartum Visit    Subjective:  24 y.o.  female S/P uncomplicated Vaginal Delivery on 21 here for postpartum visit. The pregnancy was complicated by Rh negative, GBBS positive, teen pregnancy and maternal weight. Postpartum course has been uncomplicated. No pain. Bleeding is minimal. Bowel function is normal. Bladder function is normal. Patient is not sexually active. Contraception method is abstinence. Baby has been doing well without problems. Baby is feeding both breast and bottle - formula and expressed BM. No other complaints are noted including headache, change in vision, fever, chills, chest pain, shortness of breath, nausea, vomiting, diarrhea or constipation. The patient denies any urinary complaints or calf tenderness. Review of Systems - The following ROS was otherwise negative, except as noted in the HPI: constitutional, respiratory, cardiovascular, gastrointestinal, genitourinary    Objective:  GENERAL APPEARANCE: alert, well appearing, in no apparent distress  LUNGS: clear to auscultation, no wheezes, rales or rhonchi, symmetric air entry  HEART: regular rate and rhythm  ABDOMEN POSTPARTUM: benign non-tender, without masses or organomegaly palpable  EXTREMITIES: no redness or tenderness in the calves or thighs, no edema    MWQ: 1, no SI/HI    Impression:  24 y.o.  s/p vaginal delivery on 21 here for her postpartum visit:    Plan:  1.   (normal spontaneous vaginal delivery)  Doing well, routine care  - Feeding: breast and bottle  - BCM: declines, readdress at next visit  - Moods: no signs/sx PPD, denies SI/HI  - Bleeding: minimal    Dispo: 4 weeks for PPV  Komal De Santiago MD

## 2021-08-25 ENCOUNTER — POSTPARTUM VISIT (OUTPATIENT)
Dept: OBGYN CLINIC | Age: 21
End: 2021-08-25

## 2021-08-25 VITALS
DIASTOLIC BLOOD PRESSURE: 80 MMHG | HEART RATE: 81 BPM | BODY MASS INDEX: 37.65 KG/M2 | SYSTOLIC BLOOD PRESSURE: 126 MMHG | TEMPERATURE: 97.4 F | WEIGHT: 240.4 LBS

## 2021-08-25 PROCEDURE — 0503F POSTPARTUM CARE VISIT: CPT | Performed by: OBSTETRICS & GYNECOLOGY

## 2022-04-05 ENCOUNTER — OFFICE VISIT (OUTPATIENT)
Dept: OBGYN CLINIC | Age: 22
End: 2022-04-05
Payer: MEDICAID

## 2022-04-05 VITALS
TEMPERATURE: 97 F | HEART RATE: 91 BPM | WEIGHT: 285.2 LBS | SYSTOLIC BLOOD PRESSURE: 144 MMHG | DIASTOLIC BLOOD PRESSURE: 90 MMHG | BODY MASS INDEX: 44.67 KG/M2

## 2022-04-05 DIAGNOSIS — N91.2 AMENORRHEA: Primary | ICD-10-CM

## 2022-04-05 PROCEDURE — 81025 URINE PREGNANCY TEST: CPT | Performed by: OBSTETRICS & GYNECOLOGY

## 2022-04-05 PROCEDURE — 36415 COLL VENOUS BLD VENIPUNCTURE: CPT | Performed by: OBSTETRICS & GYNECOLOGY

## 2022-04-05 PROCEDURE — 99213 OFFICE O/P EST LOW 20 MIN: CPT | Performed by: OBSTETRICS & GYNECOLOGY

## 2022-04-05 RX ORDER — MEDROXYPROGESTERONE ACETATE 10 MG/1
10 TABLET ORAL DAILY
Qty: 10 TABLET | Refills: 0 | Status: SHIPPED | OUTPATIENT
Start: 2022-04-05 | End: 2022-04-15

## 2022-04-05 NOTE — PROGRESS NOTES
Return Gyn Office Visit    CC:   Chief Complaint   Patient presents with    Amenorrhea       HPI:  22 y.o.  who presents to office for possible amenorrhea. LMP 12/3/21, took a pregnancy test in January that was negative, also in February was negative. This month she took one and had a faint positive test so came in for evaluation. UPT here negative. S/p  21, got periods back in August and were regular once/month through December. Had some very light spotting in February but not a full period. No HA/Chest pain, pelvic pain, no hot/cold intolerance. Was just worried so came in for evaluation. Objective:  BP (!) 144/90 (Site: Left Upper Arm, Position: Sitting, Cuff Size: Large Adult)   Pulse 91   Temp 97 °F (36.1 °C) (Infrared)   Wt 285 lb 3.2 oz (129.4 kg)   LMP 2021 (Approximate)   BMI 44.67 kg/m²   Physical Exam  HENT:      Head: Normocephalic. Cardiovascular:      Rate and Rhythm: Normal rate. Pulmonary:      Effort: Pulmonary effort is normal. No respiratory distress. Abdominal:      Palpations: Abdomen is soft. Tenderness: There is no abdominal tenderness. Skin:     General: Skin is warm and dry. Neurological:      Mental Status: She is alert. Labs:   UPT negative    Assessment/Plan  1. Amenorrhea  Patient with missed periods since December, UPT negative here (faint positive at home). Will send below labs, if wnl will do progesterone withdrawal with provera. If persistently abnormal consider US/additional imaging. Patient declines BCM at this time however may consider for menstrual regulation if no return to regular monthly menses.   - HCG, QUANTITATIVE, PREGNANCY  - TSH with Reflex  - Prolactin  - POCT urine pregnancy    Dispo: pending labs/Provera challenge  Bindu Russo MD

## 2022-04-06 LAB
GONADOTROPIN, CHORIONIC (HCG) QUANT: <5 MIU/ML
PROLACTIN: 30.2 NG/ML
TSH REFLEX: 1.6 UIU/ML (ref 0.27–4.2)